# Patient Record
Sex: FEMALE | Race: ASIAN | NOT HISPANIC OR LATINO | Employment: FULL TIME | URBAN - METROPOLITAN AREA
[De-identification: names, ages, dates, MRNs, and addresses within clinical notes are randomized per-mention and may not be internally consistent; named-entity substitution may affect disease eponyms.]

---

## 2017-01-11 ENCOUNTER — GENERIC CONVERSION - ENCOUNTER (OUTPATIENT)
Dept: OTHER | Facility: OTHER | Age: 57
End: 2017-01-11

## 2017-01-11 ENCOUNTER — HOSPITAL ENCOUNTER (OUTPATIENT)
Dept: MAMMOGRAPHY | Facility: HOSPITAL | Age: 57
Discharge: HOME | End: 2017-01-11
Payer: COMMERCIAL

## 2017-01-11 DIAGNOSIS — Z12.31 ENCOUNTER FOR SCREENING MAMMOGRAM FOR MALIGNANT NEOPLASM OF BREAST: ICD-10-CM

## 2017-01-11 PROCEDURE — G0202 SCR MAMMO BI INCL CAD: HCPCS

## 2017-01-31 ENCOUNTER — GENERIC CONVERSION - ENCOUNTER (OUTPATIENT)
Dept: OTHER | Facility: OTHER | Age: 57
End: 2017-01-31

## 2017-10-06 ENCOUNTER — GENERIC CONVERSION - ENCOUNTER (OUTPATIENT)
Dept: OTHER | Facility: OTHER | Age: 57
End: 2017-10-06

## 2017-11-03 ENCOUNTER — GENERIC CONVERSION - ENCOUNTER (OUTPATIENT)
Dept: OTHER | Facility: OTHER | Age: 57
End: 2017-11-03

## 2017-12-04 ENCOUNTER — ALLSCRIPTS OFFICE VISIT (OUTPATIENT)
Dept: OTHER | Facility: OTHER | Age: 57
End: 2017-12-04

## 2017-12-04 DIAGNOSIS — F41.9 ANXIETY DISORDER: ICD-10-CM

## 2017-12-04 DIAGNOSIS — N94.9 UNSPECIFIED CONDITION ASSOCIATED WITH FEMALE GENITAL ORGANS AND MENSTRUAL CYCLE (CODE): ICD-10-CM

## 2017-12-04 DIAGNOSIS — E78.00 PURE HYPERCHOLESTEROLEMIA: ICD-10-CM

## 2018-01-10 NOTE — MISCELLANEOUS
Message   Recorded as Task   Date: 07/21/2016 06:28 PM, Created By: System   Task Name: Schedule Appointment   Assigned To: [address]   Regarding Patient: Ronan Hsieh, Status: Active   Comment:    System - 21 Jul 2016 6:28 PM        Tammy Dominguez - 21 Jul 2016 6:29 PM     TASK REASSIGNED: Previously Assigned To Namita Brink      Please call patient to schedule an appointment within 48 hours on their home phone  The best time to reach the patient is in the day  Patient prefers appointment to be  or first available  After this appointment is scheduled please reply back to [facility] team    Jalil Everett - 22 Jul 2016 8:20 AM     TASK IN PROGRESS   Jennifer Davies - 22 Jul 2016 9:58 AM     TASK EDITED  lv @ [phone]   Jennifer Davies - 26 Jul 2016 3:10 PM     TASK EDITED  sched 08/03 @ 6pm sla        Active Problems    1  Abdominal pain, RLQ (789 03) (R10 31)   2  Abnormal CT of the abdomen (793 6) (R93 5)   3  Allergic rhinitis (477 9) (J30 9)   4  Anemia (285 9) (D64 9)   5  Breast cancer screening (V76 10) (Z12 39)   6  Depression (311) (F32 9)   7  Esophageal reflux (530 81) (K21 9)   8  Hypercholesterolemia (272 0) (E78 0)   9  Post traumatic stress disorder (309 81) (F43 10)   10  History of Vasovagal syncope (780 2) (R55)    Current Meds   1  Calcium 600 + D TABS; Therapy: (Recorded:11Jun2012) to Recorded   2  Claritin 10 MG Oral Capsule; TAKE 2 CAPSULE Daily as per allergist;   Therapy: 67LXG3242 to (Evaluate:11Nov2013); Last Rx:10Nov2013 Ordered   3  Dulera 200-5 MCG/ACT Inhalation Aerosol; INHALE 2 PUFFS TWICE DAILY; Therapy: 41BVP5621 to (Last Rx:10Nov2013) Ordered   4  Escitalopram Oxalate 5 MG Oral Tablet; take 1 tablet daily in am;   Therapy: 66KHS6890 to (Evaluate:49Apn0659)  Requested for: 44XUV8710; Last   Rx:08Jan2015 Ordered   5  Montelukast Sodium 10 MG Oral Tablet; TAKE 1 TABLET EVERY MORNING;    Therapy: 85FGR9972 to (Last Rx:10Nov2013) Ordered 6  Nasonex 50 MCG/ACT Nasal Suspension (Mometasone Furoate); Therapy: (Recorded:10Nov2013) to Recorded   7  Vitamin D3 CAPS; 3000 units a day; Therapy: (Recorded:10Nov2013) to Recorded    Allergies    1  Amoxicillin CAPS   2  Codeine Derivatives   3   Contrast Media Ready-Box MISC    Signatures   Electronically signed by : Fredrick Bernal, ; Jul 26 2016  6:36PM EST                       (Author)

## 2018-01-15 NOTE — MISCELLANEOUS
Message   Recorded as Task   Date: 01/19/2017 01:19 PM, Created By: Franck Forrest   Task Name: Call Back   Assigned To: Jeff Chen   Regarding Patient: Dania Michel, Status: In Progress   Comment:    Clarita Aparicio - 19 Jan 2017 1:19 PM     TASK CREATED    PER DR CHEN - SHE MUST SEE GYN ASAP FOR SUSPICIOUS ADNEXAL MASS  NA X 2  Clarita Aparicio - 19 Jan 2017 7:00 PM     TASK EDITED  Spoke w pt and she saw Dr Tong An ob/gyn on Dec 23 and has a fu appt in March  We will call and get his notes  [phone]  Clarita Aparicio - 19 Jan 2017 7:06 PM     TASK EDITED  Mri faxed to Clarita [address] - 19 Jan 2017 7:14 PM     TASK REASSIGNED: Previously Assigned To Swathi Chakraborty - 20 Jan 2017 1:58 PM     TASK IN PROGRESS   Clarita Aparicio - 23 Jan 2017 8:04 AM     TASK EDITED  Request for records faxed to Dr Zoe Loomis  by [facility] on 1/20     Clarita Aparicio - 23 Jan 2017 6:28 PM     TASK REASSIGNED: Previously Assigned To Buddy Mccabe - 25 Jan 2017 12:07 PM     TASK EDITED  await gyn note -   make sure adnexal mass is being addressed   Jeff Chen - 31 Jan 2017 5:21 PM     TASK REPLIED TO: Previously Assigned To Jeff Chen d/[facility]-   they had seen in late Dec and did [address] and will see her again in March-   they are sending visit note/ US report        Signatures   Electronically signed by : Tamra Bullock DO; Jan 31 2017  5:21PM EST                       (Author)

## 2018-01-15 NOTE — PROGRESS NOTES
Assessment    1  Encounter for preventive health examination (V70 0) (Z00 00)    Plan  Adnexal mass    · * MRI PELVIS WO CONTRAST; Status:Need Information - Financial Authorization; Requested for:28Nov2016;   Colon cancer screening    · COLONOSCOPY; Status:Active; Requested for:28Nov2016;    · *1 - [facility] Physician Referral  Consult  Status:  Active  Requested for: 75CES1508  Care Summary provided  : Yes  Encounter for screening mammogram for breast cancer    · * MAMMO SCREENING BILATERAL W CAD; Status:Active; Requested for:28Nov2016;   PMH: Cough    · Dulera 200-5 MCG/ACT Inhalation Aerosol; INHALE 2 PUFFS TWICE DAILY    Discussion/Summary  health maintenance visit cervical cancer screening is current sees Gyn Breast cancer screening: mammogram has been ordered and gave slip for mammogram as did not do after last gyn visit  Colorectal cancer screening: the risks and benefits of colorectal cancer screening were discussed and colonoscopy has been ordered  The declines flu shot  She was advised to be evaluated by an optometrist and a dentist      Overall she has been feeling well - she does have ongoing cough/ tickle in throat at times- is switching to other Allergist as prior- Sh[facility]ain- retired- in past used allergy shots  I refilled Gerilyn Rude for her  She no longer has abdominal pain - see CT scan / US pelvis done few mo ago - has shadow left adnexa- has not yet done MRI pelvis - slip given for same  She relates she had microscopic hematuria at recent visit w Uro-Gyn and is scheduled for another CT scan - discussed as just had CT in july that showed no stones/ kidneys ok - see no need for redo CT - will forward copy CT/ US to [address] [place] [address]  She relates did blood work for [employer] and [facility]- we will get report - had lipids 1 yr ago - redo next year  She stopped Lexapro - can stay off after discussion as feels well    She does see Gyn   Slip for mammogram       Chief Complaint  physical      History of Present Illness  HPI: in for yearly PE Feels well other than cough - active  No further abd pain - see prev CT/ US Never did MRI as appears felt insurance would not cover? No pelvic bloating    Still needs colonoscopy screen sees Gyn    Also saw Uro Gyn - see note      Review of Systems    Constitutional: No fever, no chills, feels well, no tiredness, no recent weight gain or weight loss  Eyes: No complaints of eye pain, no red eyes, no eyesight problems, no discharge, no dry eyes, no itching of eyes  ENT: no complaints of earache, no loss of hearing, no nose bleeds, no nasal discharge, no sore throat, no hoarseness  Cardiovascular: No complaints of slow heart rate, no fast heart rate, no chest pain, no palpitations, no leg claudication, no lower extremity edema  Respiratory: No complaints of shortness of breath, no wheezing, no cough, no SOB on exertion, no orthopnea, no PND  Gastrointestinal: No complaints of abdominal pain, no constipation, no nausea or vomiting, no diarrhea, no bloody stools  Genitourinary: no dysuria  Musculoskeletal: No complaints of arthralgias, no myalgias, no joint swelling or stiffness, no limb pain or swelling  Integumentary: no rashes and no skin lesions  Neurological: no headache, no confusion, no dizziness, no limb weakness, no convulsions, no fainting and no difficulty walking  Psychiatric: no anxiety and no depression  Hematologic/Lymphatic: No complaints of swollen glands, no swollen glands in the neck, does not bleed easily, does not bruise easily  Active Problems    1  Allergic rhinitis (477 9) (J30 9)   2  Anemia (285 9) (D64 9)   3  Breast cancer screening (V76 10) (Z12 39)   4  Esophageal reflux (530 81) (K21 9)   5  History of depression (V11 8) (Z86 59)   6  Hypercholesterolemia (272 0) (E78 00)   7  Post traumatic stress disorder (309 81) (F43 10)   8   History of Vasovagal syncope (780 2) (R55)    Past Medical History    · History of Abdominal pain, RLQ (789 03) (R10 31)   · History of Abnormal Liver Function Test (790 6)   · History of Carpal tunnel syndrome, unspecified laterality (354 0) (G56 00)   · History of Cervical cancer screening (V76 2) (Z12 4)   · History of Cough (786 2) (R05)   · History of depression (V11 8) (Z86 59)   · History of Pain in joint of left shoulder (719 41) (M25 512)   · History of Post traumatic stress disorder (309 81) (F43 10)   · History of Vasovagal syncope (780 2) (R55)    Surgical History    · History of Cholecystectomy   · History of Complete Colonoscopy   · History of Hand Surgery    Social History    · Marital History - Currently    · Never a smoker   · Never Drank Alcohol    Current Meds   1  Calcium 600 + D TABS; Therapy: (Recorded:11Jun2012) to Recorded   2  Claritin 10 MG Oral Capsule; TAKE 2 CAPSULE Daily as per allergist;   Therapy: 10KFA9273 to (Evaluate:11Nov2013); Last Rx:10Nov2013 Ordered   3  Dulera 200-5 MCG/ACT Inhalation Aerosol; INHALE 2 PUFFS TWICE DAILY; Therapy: 18WUW3327 to (Last Rx:10Nov2013) Ordered   4  Montelukast Sodium 10 MG Oral Tablet; TAKE 1 TABLET EVERY MORNING; Therapy: 95ETG1421 to (Last Rx:10Nov2013) Ordered   5  Nasonex 50 MCG/ACT Nasal Suspension; Therapy: (Recorded:10Nov2013) to Recorded   6  Vitamin D3 CAPS; 3000 units a day; Therapy: (Recorded:10Nov2013) to Recorded    Allergies    1  Amoxicillin CAPS   2  Codeine Derivatives   3  Contrast Media Ready-Box MISC    Vitals   Recorded: 35GNL8940 83:99VD   Systolic 842   Diastolic 78   Height 5 ft 3 5 in   Weight 148 lb 6 oz   BMI Calculated 25 87   BSA Calculated 1 71     Physical Exam    Constitutional   General appearance: No acute distress, well appearing and well nourished  Pleasant healthy female seated no acute distress  Head and Face   Head and face: Normal     Palpation of the face and sinuses: No sinus tenderness      Eyes   Conjunctiva and lids: No swelling, erythema or discharge  Ears, Nose, Mouth, and Throat   Hearing: Normal     Lips, teeth, and gums: Normal, good dentition  Oropharynx: Normal with no erythema, edema, exudate or lesions  Neck   Neck: Supple, symmetric, trachea midline, no masses  Thyroid: Normal, no thyromegaly  Pulmonary   Auscultation of lungs: Clear to auscultation  Cardiovascular   Auscultation of heart: Normal rate and rhythm, normal S1 and S2, no murmurs  Carotid pulses: 2+ bilaterally  No ankle edema  Lymphatic   Palpation of lymph nodes in neck: No lymphadenopathy  Musculoskeletal   Gait and station: Normal     Neurologic   Cranial nerves: Cranial nerves II-XII intact  Psychiatric   Judgment and insight: Normal     Orientation to person, place, and time: Normal     Recent and remote memory: Intact  Mood and affect: Normal        Results/Data  PHQ-9 Adult Depression Screening 28Nov2016 12:59PM User, s     Test Name Result Flag Reference   PHQ-9 Adult Depression Score 0     Over the last two weeks, how often have you been bothered by any of the following problems? Little interest or pleasure in doing things: Not at all - 0  Feeling down, depressed, or hopeless: Not at all - 0  Trouble falling or staying asleep, or sleeping too much: Not at all - 0  Feeling tired or having little energy: Not at all - 0  Poor appetite or over eating: Not at all - 0  Feeling bad about yourself - or that you are a failure or have let yourself or your family down: Not at all - 0  Trouble concentrating on things, such as reading the newspaper or watching television: Not at all - 0  Moving or speaking so slowly that other people could have noticed   Or the opposite -  being so fidgety or restless that you have been moving around a lot more than usual: Not at all - 0  Thoughts that you would be better off dead, or of hurting yourself in some way: Not at all - 0   PHQ-9 Adult Depression Screening Negative     PHQ-9 Difficulty Level Not difficult at all     PHQ-9 Severity No Depression         Health Management  History of Cervical cancer screening   (1) THIN PREP PAP WITH IMAGING; every 5 years; Next Due: 11Aug2015;  Overdue    Future Appointments    Date/Time Provider Specialty Site   12/04/2017 03:00 PM Isi Lamb DO Family Medicine [address] [facility]     Signatures   Electronically signed by : Marlis Nissen, DO; Nov 28 2016  5:25PM EST                       (Author)

## 2018-01-16 NOTE — RESULT NOTES
Verified Results  * CT ABDOMEN PELVIS W CONTRAST 45Hqb6882 01:35PM Cristi Dunham     Test Name Result Flag Reference   CT ABDOMEN PELVIS W CONTRAST (Report)     CT ABDOMEN AND PELVIS WITH IV CONTRAST     INDICATION: Right lower quadrant pain x2 days  COMPARISON: CT abdomen 9/19/2008  TECHNIQUE: CT examination of the abdomen and pelvis was performed after the administration of intravenous contrast  This examination, like all CT scans performed in the [facility], was performed utilizing techniques to minimize    radiation dose exposure, including the use of iterative reconstruction and automated exposure control  Axial, sagittal, and coronal reformatted images were submitted for interpretation  100 cc of intravenous Omnipaque 350 was administered for this    examination  Enteric contrast was not given  The patient experienced a mild allergic reaction to the contrast dye following the injection, consisting of urticaria  She was monitored in the department to ensure resolution of the symptoms following the scan  FINDINGS:     ABDOMEN     LOWER CHEST: No significant abnormalities identified in the lower chest      LIVER/BILIARY TREE: Unremarkable  GALLBLADDER: Gallbladder is surgically absent  SPLEEN: Unremarkable  PANCREAS: Unremarkable  ADRENAL GLANDS: Unremarkable  KIDNEYS/URETERS: Unremarkable  No hydronephrosis  STOMACH AND BOWEL: There is an ovoid fatty density measuring 2 3 cm with mild surrounding fat stranding in the left inferior pelvis, seen on axial series 271 and coronal series 601 image 55, which abuts the sigmoid colon  This is suggestive of epiploic   appendagitis  Gastrointestinal tract is otherwise unremarkable  APPENDIX: A normal appendix was visualized  ABDOMINOPELVIC CAVITY: No ascites or free intraperitoneal air  No lymphadenopathy  VESSELS: Unremarkable for patient's age       PELVIS     REPRODUCTIVE ORGANS: There is a 4 2 x 2 7 x 3 9 cm complex hypodense lesion in the left adnexa, likely related to the ovary  URINARY BLADDER: Unremarkable  ABDOMINAL WALL/INGUINAL REGIONS: Unremarkable  OSSEOUS STRUCTURES: No acute fracture or destructive osseous lesion  IMPRESSION:     1  Mild inflammatory changes in the left inferior pelvis suggestive of acute epiploic appendagitis related to the sigmoid colon  This is a benign self-limited inflammatory process, usually requiring only conservative management  2  Left adnexal 4 2 cm hypodense lesion, presumably representing a complex ovarian cyst versus neoplasm  Follow-up ultrasound recommended  Findings were personally discussed via telephone with Clive Fountain at the time of dictation         Workstation performed: [id number]     Signed by:   Pita Garza MD   7/20/16   50

## 2018-01-16 NOTE — RESULT NOTES
Verified Results  * US PELVIS COMPLETE (TRANSABDOMINAL AND TRANSVAGINAL) [id number] 04:52PM Jeremias Jaimes    Order Number: [id number]    - Patient Instructions: To schedule this appointment, please contact Central Scheduling at [phone]  Test Name Result Flag Reference   US PELVIS COMPLETE (TRANSABDOMINAL AND TRANSVAGINAL) (Report)     PELVIC ULTRASOUND, COMPLETE     INDICATION: 49-year-old female for follow-up left adnexal mass  COMPARISON: CT abdomen and pelvis 7/20/2016  TECHNIQUE:  Transabdominal pelvic ultrasound was performed in sagittal and transverse planes with a curvilinear transducer  Additional transvaginal imaging was performed to better evaluate the endometrium and ovaries  Imaging included volumetric    sweeps as well as traditional still imaging technique  FINDINGS:     UTERUS:   The uterus is anteverted in position, measuring 6 2 x 2 7 x 4 6 cm  Contour and echotexture appear normal      The cervix shows no suspicious abnormality  ENDOMETRIUM:    Normal caliber of 1 2 mm  Homogenous and normal in appearance  OVARIES/ADNEXA:   Right ovary: 2 0 x 1 3 x 1 1 cm  No suspicious right ovarian abnormality  Doppler flow within normal limits  Left ovary: 4 2 x 3 0 x 3 0 cm  Shadowing arising from the prominent, heterogeneous ovary is identified which is inseparable from the normal parenchyma  A discrete mass however is difficult to discern  This likely corresponds to the CT abnormality for which MRI is recommended for    further characterization  Doppler flow within normal limits  No suspicious adnexal mass or loculated collections  There is no free fluid  IMPRESSION:      Abnormal shadowing arising from the heterogeneous left ovary corresponding to the CT finding  Further characterization with MRI recommended            Workstation performed: [id number]     Signed by:   Lauren Rae MD   8/4/16

## 2018-01-16 NOTE — PROGRESS NOTES
Assessment    1  Adnexal mass (625 8) (N94 9)   · 4cm left adnexal mass 2016    Plan  Adnexal mass    · Gynecology Follow up Evaluation and Treatment  consult re 4 cm adnexal mass seen  adjacent to left ovary on recent MRI  Status: Hold For - Scheduling  Requested for:  49Jks3775  Flu vaccine need    · Stop: Fluzone Quadrivalent Intramuscular Suspension    Discussion/Summary    *** Reacted to Gadolinium so was limited MRI *** remote itch w Contrast Dye --    We discussed recent MRI result, has 4 cm area adjacent to left ovary which needs further evaluation, probable surgery  We will set her up with her gynecologist for consultation and forward results to them  Chief Complaint  Discuss MRI results      History of Present Illness  in to discuss MRI Feels well Did react to Gadolinium     See recent PE note w me in late Nov re full exam etc      Review of Systems    Constitutional: no change in ROS otherwise  Active Problems    1  Adnexal mass (625 8) (N94 9)   2  Allergic rhinitis (477 9) (J30 9)   3  Anemia (285 9) (D64 9)   4  Breast cancer screening (V76 10) (Z12 39)   5  Colon cancer screening (V76 51) (Z12 11)   6  Encounter for screening mammogram for breast cancer (V76 12) (Z12 31)   7  Esophageal reflux (530 81) (K21 9)   8  History of depression (V11 8) (Z86 59)   9  Hypercholesterolemia (272 0) (E78 00)   10  Post traumatic stress disorder (309 81) (F43 10)   11  History of Vasovagal syncope (780 2) (R55)    Past Medical History    1  History of Abdominal pain, RLQ (789 03) (R10 31)   2  History of Abnormal Liver Function Test (790 6)   3  History of Carpal tunnel syndrome, unspecified laterality (354 0) (G56 00)   4  History of Cervical cancer screening (V76 2) (Z12 4)   5  History of Cough (786 2) (R05)   6  History of depression (V11 8) (Z86 59)   7  History of Pain in joint of left shoulder (719 41) (M25 512)   8  History of Post traumatic stress disorder (309 81) (F43 10)   9   History of Vasovagal syncope (780 2) (R55)    Surgical History    1  History of Cholecystectomy   2  History of Complete Colonoscopy   3  History of Hand Surgery    Social History    · Marital History - Currently    · Never a smoker   · Never Drank Alcohol    Current Meds   1  Calcium 600 + D TABS; Therapy: (Recorded:11Jun2012) to Recorded   2  Claritin 10 MG Oral Capsule; TAKE 2 CAPSULE Daily as per allergist;   Therapy: 47XEH7092 to (Evaluate:11Nov2013); Last Rx:10Nov2013 Ordered   3  Dulera 200-5 MCG/ACT Inhalation Aerosol; INHALE 2 PUFFS TWICE DAILY; Therapy: 11TXO0244 to (Last Rx:28Nov2016) Ordered   4  Montelukast Sodium 10 MG Oral Tablet; TAKE 1 TABLET EVERY MORNING; Therapy: 09OEB0820 to (Last Rx:10Nov2013) Ordered   5  Nasonex 50 MCG/ACT Nasal Suspension; Therapy: (Recorded:10Nov2013) to Recorded   6  Vitamin D3 CAPS; 3000 units a day; Therapy: (Recorded:10Nov2013) to Recorded    Allergies    1  Gadolinium Derivatives   2  Amoxicillin CAPS   3  Codeine Derivatives   4  Contrast Media Ready-Box MISC    Vitals  Vital Signs    Recorded: 74Lku3844 10:29AM   Heart Rate 64   Systolic 744   Diastolic 70   Height 5 ft 3 5 in   Weight 149 lb 2 08 oz   BMI Calculated 26   BSA Calculated 1 71     Health Management  History of Cervical cancer screening   (1) THIN PREP PAP WITH IMAGING; every 5 years; Next Due: 11Aug2015;  Overdue    Future Appointments    Date/Time Provider Specialty Site   12/04/2017 03:00 PM Kenia Perla DO Family Medicine [address] [facility] MED[facility]     Signatures   Electronically signed by : Stefano Elam DO; Dec 21 2016 11:04AM EST                       (Author)

## 2018-01-17 NOTE — RESULT NOTES
Verified Results  * MAMMO SCREENING BILATERAL W CAD [id number] 02:37PM Cathlean Schlatter Order Number: [id number]    - Patient Instructions: To schedule this appointment, please contact Central Scheduling at [phone]  Do not wear any perfume, powder, lotion or deodorant on breast or underarm area  Please bring your doctors order, referral (if needed) and insurance information with you on the day of the test  Failure to bring this information may result in this test being rescheduled  Arrive 15 minutes prior to your appointment time to register  On the day of your test, please bring any prior mammogram or breast studies with you that were not performed at a [facility]  Failure to bring prior exams may result in your test needing to be rescheduled  Test Name Result Flag Reference   MAMMO SCREENING BILATERAL W CAD (Report)     Patient History:   Patient is postmenopausal    No known family history of cancer  Patient has never smoked  Patient's BMI is 26 2  Reason for exam: screening (asymptomatic)  Mammo Screening Bilateral W CAD: January 11, 2017 - Check In #:    [de-identified]   Bilateral MLO, CC, and XCCL view(s) were taken  Technologist: Janki Quinones R T (R)(M)   Prior study comparison: November 24, 2014, bilateral digital    screening mammogram, performed at [facility] 112  February 22, 2007, bilateral digital screening    mammo w/CAD, performed at [facility] 89  There are scattered fibroglandular densities  The parenchymal pattern appears stable  No dominant soft tissue    mass or suspicious calcifications are noted  The skin and nipple   contours are within normal limits  No mammographic evidence of malignancy  No    significant changes when compared with prior studies  ASSESSMENT: BiRad:1 - Negative     Recommendation:   Routine screening mammogram in 1 year   A reminder letter will be   scheduled  Analyzed by CAD     8-10% of cancers will be missed on mammography  Management of a    palpable abnormality must be based on clinical grounds  Patients   will be notified of their results via letter from our facility  Accredited by [employer] of [employer] and FDA       Transcription Location: JODIE Hsieh 98: [id number]     Risk Value(s):   Tyrer-Cuzick 10 Year: 3 232%, Tyrer-Cuzick Lifetime: 8 827%,    Myriad Table: 1 5%, ELIZABETH 5 Year: 1 4%, NCI Lifetime: 7 8%   Signed by:   Victor Hugo Scruggs MD   1/11/17

## 2018-01-23 VITALS
DIASTOLIC BLOOD PRESSURE: 70 MMHG | BODY MASS INDEX: 25.03 KG/M2 | SYSTOLIC BLOOD PRESSURE: 128 MMHG | HEART RATE: 72 BPM | HEIGHT: 63 IN | WEIGHT: 141.25 LBS

## 2018-01-23 NOTE — PROGRESS NOTES
Assessment    1  Encounter for preventive health examination (V70 0) (Z00 00)    Plan  Adnexal mass    · (1) COMPREHENSIVE METABOLIC PANEL; Status:Active; Requested for:10Atu9268; Anxiety    · Sertraline HCl - 50 MG Oral Tablet; take 1/2 pill in AM x 1 week then 1 QAM   · (1) TSH WITH FT4 REFLEX; Status:Active; Requested for:64Gzx6592;   Hypercholesterolemia    · (1) LIPID PANEL, FASTING; Status:Active; Requested for:70Kff1094;   PMH: Vasovagal syncope    · COLONOSCOPY; Status:Active; Requested for:45Pbb9890;     Discussion/Summary  health maintenance visit Currently, she eats an adequate diet and walks for exercise  cervical cancer screening is current Breast cancer screening: mammogram is current and 1/17  Colorectal cancer screening: the risks and benefits of colorectal cancer screening were discussed and she will set up redo w [facility]/ Dr Austin Bazzi  She is doing well here today, she does have past history anxiety, previously felt nauseated with citalopram, I would like her to try sertraline once daily in the morning after using 1/2 pill for the 1st week, we did discuss this will take 4 to 6 weeks to fully take effect, she can call us with an update then, I would like to see her every 6 months  We did review her visit in early October to emergency room with [place], hemoglobin 12 7, CMP okay other than glucose 116, chest x-ray and EKG okay  Longstanding history orthostatics symptoms/ vasovagal syncope, call us if this worsens, make sure to keep hydrated  She will be seeing her gynecologist later this week regarding left adnexal mass seen on prior MRI, she relates she has been following up for this with them  Also had seen urogynecologist in the past      She has not seen her allergist, is off inhalers / Singulair, call if increased cough or wheezing      She did see orthopedist regarding right shoulder,    She will do fasting blood work including lipids, continue to watch healthy diet, appears her weight loss is related to healthy diet  6  Chief Complaint  she is in for a regular Physical      History of Present Illness  HPI: in for reg check - she has been feeling well - other than occ lightheadedness- longstanding issue w vasovagal symptoms- was at ER [facility] 10/17 - BW/ CXR/ EKG were fine -   Relates lost weight as has been watching healthy diet  Not seeing Allergist - hers retired - she stopped meds for that and denies inc cough or wheeze    Sees Gyn- had eval w uro-gyn- never did biopsy/ has adnexal mass - relates will be seeing Gyn again this week to re-eval w [address] re right shoulder     Does have past history anxiety with posttraumatic stress related to trauma when younger in [facility], - previously use citalopram but stopped as felt cause some nausea although it did help, now requests new medication, note some mood swings with irritability along with anxiety  Review of Systems    Constitutional: as noted in HPI, no fever, not feeling poorly, no chills and not feeling tired  Eyes: no eyesight problems  ENT: no earache and no sore throat  Cardiovascular: No complaints of slow heart rate, no fast heart rate, no chest pain, no palpitations, no leg claudication, no lower extremity edema  Respiratory: No complaints of shortness of breath, no wheezing, no cough, no SOB on exertion, no orthopnea, no PND  Gastrointestinal: no gerd, but No complaints of abdominal pain, no constipation, no nausea or vomiting, no diarrhea, no bloody stools  Genitourinary: occ pelvic pressure, but no dysuria  Musculoskeletal: as noted in HPI  Integumentary: no skin lesions and no skin wound  Neurological: as noted in HPI, no headache, no confusion, no limb weakness and no convulsions  Psychiatric: some anxiety felt  Hematologic/Lymphatic: No complaints of swollen glands, no swollen glands in the neck, does not bleed easily, does not bruise easily  Active Problems    1   Adnexal mass (625  8) (N94 9)   2  Allergic rhinitis (477 9) (J30 9)   3  Anemia (285 9) (D64 9)   4  Contrast media adverse reaction (977 8) (T50 8X5A)   5  Esophageal reflux (530 81) (K21 9)   6  History of depression (V11 8) (Z86 59)   7  Hypercholesterolemia (272 0) (E78 00)   8  Post traumatic stress disorder (309 81) (F43 10)   9  History of Vasovagal syncope (780 2) (R55)    Past Medical History    · History of Abdominal pain, RLQ (789 03) (R10 31)   · History of Abnormal Liver Function Test (790 6)   · History of Carpal tunnel syndrome, unspecified laterality (354 0) (G56 00)   · History of Cervical cancer screening (V76 2) (Z12 4)   · History of Cough (786 2) (R05)   · History of depression (V11 8) (Z86 59)   · History of Pain in joint of left shoulder (719 41) (M25 512)   · History of Post traumatic stress disorder (309 81) (F43 10)   · History of Vasovagal syncope (780 2) (R55)    Surgical History    · History of Cholecystectomy   · History of Complete Colonoscopy   · History of Hand Surgery    Social History    · Marital History - Currently    · Never a smoker   · Never Drank Alcohol    Current Meds   1  Calcium 600 + D TABS; Therapy: (Recorded:11Jun2012) to Recorded   2  Claritin 10 MG Oral Capsule; TAKE 2 CAPSULE Daily as per allergist;   Therapy: 50TZV9520 to (Evaluate:11Nov2013); Last Rx:10Nov2013 Ordered   3  Vitamin D3 CAPS; 3000 units a day; Therapy: (Recorded:10Nov2013) to Recorded    Allergies    1  Gadolinium Derivatives   2  Amoxicillin CAPS   3  Codeine Derivatives   4  Contrast Media Ready-Box MISC    Vitals   Recorded: 99XGK8862 02:52PM   Heart Rate 72   Systolic 013   Diastolic 70   Height 5 ft 3 in   Weight 141 lb 4 oz   BMI Calculated 25 02   BSA Calculated 1 67     Physical Exam    Constitutional   General appearance: No acute distress, well appearing and well nourished  healthy female     Head and Face   Head and face: Normal     Eyes   Conjunctiva and lids: No swelling, erythema or discharge  Ears, Nose, Mouth, and Throat   Otoscopic examination: Tympanic membranes translucent with normal light reflex  Canals patent without erythema  Hearing: Normal     Lips, teeth, and gums: Normal, good dentition  Oropharynx: Normal with no erythema, edema, exudate or lesions  Neck   Neck: Supple, symmetric, trachea midline, no masses  Thyroid: Normal, no thyromegaly  Pulmonary   Auscultation of lungs: Clear to auscultation  Cardiovascular   Auscultation of heart: Normal rate and rhythm, normal S1 and S2, no murmurs  Carotid pulses: 2+ bilaterally  Examination of extremities for edema and/or varicosities: Normal     Abdomen   Abdomen: Non-tender, no masses  Lymphatic   Palpation of lymph nodes in neck: No lymphadenopathy  Musculoskeletal   Gait and station: Normal     Neurologic   Cortical function: Normal mental status  Coordination: Normal finger to nose and heel to shin  Psychiatric   Judgment and insight: Normal     Orientation to person, place, and time: Normal     Recent and remote memory: Intact  Mood and affect: Normal        Health Management  History of Cervical cancer screening   (1) THIN PREP PAP WITH IMAGING; every 5 years; Next Due: 07Yit9939;  Overdue    Signatures   Electronically signed by : Analilia Figueroa DO; Dec  4 2017  3:56PM EST                       (Author)

## 2018-06-18 RX ORDER — IBUPROFEN 200 MG
200 TABLET ORAL EVERY 6 HOURS
COMMUNITY
End: 2018-12-03 | Stop reason: ALTCHOICE

## 2018-06-18 RX ORDER — MOMETASONE FUROATE 50 UG/1
SPRAY, METERED NASAL
COMMUNITY
End: 2018-06-19 | Stop reason: ALTCHOICE

## 2018-06-18 RX ORDER — NABUMETONE 500 MG/1
500 TABLET, FILM COATED ORAL 2 TIMES DAILY
COMMUNITY
Start: 2017-10-05 | End: 2018-06-19 | Stop reason: ALTCHOICE

## 2018-06-18 RX ORDER — MONTELUKAST SODIUM 10 MG/1
1 TABLET ORAL DAILY
COMMUNITY
Start: 2013-11-10 | End: 2018-06-19 | Stop reason: ALTCHOICE

## 2018-06-19 ENCOUNTER — OFFICE VISIT (OUTPATIENT)
Dept: FAMILY MEDICINE CLINIC | Facility: CLINIC | Age: 58
End: 2018-06-19
Payer: COMMERCIAL

## 2018-06-19 VITALS
SYSTOLIC BLOOD PRESSURE: 136 MMHG | HEIGHT: 63 IN | HEART RATE: 72 BPM | DIASTOLIC BLOOD PRESSURE: 80 MMHG | BODY MASS INDEX: 25.59 KG/M2 | WEIGHT: 144.4 LBS | OXYGEN SATURATION: 96 %

## 2018-06-19 DIAGNOSIS — F41.9 ANXIETY: Primary | ICD-10-CM

## 2018-06-19 DIAGNOSIS — Z12.31 SCREENING MAMMOGRAM, ENCOUNTER FOR: ICD-10-CM

## 2018-06-19 PROBLEM — N32.9 LESION OF BLADDER: Status: ACTIVE | Noted: 2017-01-19

## 2018-06-19 PROBLEM — R31.29 MICROSCOPIC HEMATURIA: Status: ACTIVE | Noted: 2017-01-19

## 2018-06-19 PROCEDURE — 1036F TOBACCO NON-USER: CPT | Performed by: FAMILY MEDICINE

## 2018-06-19 PROCEDURE — 3008F BODY MASS INDEX DOCD: CPT | Performed by: FAMILY MEDICINE

## 2018-06-19 PROCEDURE — 99213 OFFICE O/P EST LOW 20 MIN: CPT | Performed by: FAMILY MEDICINE

## 2018-06-19 NOTE — PATIENT INSTRUCTIONS
She is doing very well with sertraline 50 mg daily, she will stay on this as is  She will continue with routine exercise /walking  We will see her again in 6 months with a yearly physical, we will plan to redo CMP /lipids/ CBC ( remote anemia) at that time  In December her TSH, CMP were fine  Cholesterol at that time was 2:06 a m / HDL 63,  which had been improved versus prior  She has been seeing gynecology, left adnexal mass /possible dermoid on ultrasound in January, we will touch base with them regarding when they want to see her for follow-up as she has no appointment scheduled  She also has seen Urology regarding bladder       she will be seeing Gastroenterology soon for redo colonoscopy

## 2018-06-19 NOTE — PROGRESS NOTES
FAMILY PRACTICE OFFICE VISIT  [address]  [address]   [address]  [place], [place], [address]      NAME: Trae Bush  AGE: 59 y o  SEX: female  : 1960   MRN: [id number]    DATE: 2018  TIME: 8:19 PM    Assessment and Plan     Problem List Items Addressed This Visit        Other    Anxiety - Primary    Relevant Medications    sertraline (ZOLOFT) 50 mg tablet      Other Visit Diagnoses     Screening mammogram, encounter for        Relevant Orders    Mammo diagnostic bilateral w cad          Patient Instructions   She is doing very well with sertraline 50 mg daily, she will stay on this as is  She will continue with routine exercise /walking  We will see her again in 6 months with a yearly physical, we will plan to redo CMP /lipids/ CBC ( remote anemia) at that time  In December her TSH, CMP were fine  Cholesterol at that time was 2:06 a m / HDL 63,  which had been improved versus prior  She has been seeing gynecology, left adnexal mass /possible dermoid on ultrasound in January, we will touch base with them regarding when they want to see her for follow-up as she has no appointment scheduled  She also has seen Urology regarding bladder  she will be seeing Gastroenterology soon for redo colonoscopy      Chief Complaint     Chief Complaint   Patient presents with    Follow-up     6 month Anxiety       History of Present Illness   Dipika Bush is a 59y o -year-old female who is in today for his 6 month check, I had seen her in December for a full physical   She is feeling very well, sertraline is helping, she has no complaints here today      Review of Systems   Review of Systems   Constitutional: Negative for appetite change, fatigue, fever and unexpected weight change  HENT: Negative for sore throat and trouble swallowing  Respiratory: Negative for cough, chest tightness and shortness of breath      Cardiovascular: Negative for chest pain, palpitations and leg swelling  Gastrointestinal: Negative for abdominal pain and blood in stool  Genitourinary: Negative for dysuria and hematuria  Neurological: Negative for dizziness, light-headedness and headaches  Active Problem List     Patient Active Problem List   Diagnosis    Adnexal mass    Allergic rhinitis    Anxiety    Contrast media adverse reaction    Esophageal reflux    Hypercholesterolemia    Lesion of bladder    Post traumatic stress disorder    Microscopic hematuria    ILANA (stress urinary incontinence, female)       Past Medical History:  Past Medical History:   Diagnosis Date    Abnormal LFTs     resolved    Carpal tunnel syndrome on right     Depression     last assessed 11/11/13    PTSD (post-traumatic stress disorder)     Vasovagal syncope     last assessed 06/23/14       Past Surgical History:  Past Surgical History:   Procedure Laterality Date    CHOLECYSTECTOMY      COLONOSCOPY      TRIGGER FINGER RELEASE Right        Family History:  No family history on file  Social History:  Social History     Social History    Marital status: Single     Spouse name: N/A    Number of children: N/A    Years of education: N/A     Occupational History    Not on file  Social History Main Topics    Smoking status: Never Smoker    Smokeless tobacco: Never Used    Alcohol use No    Drug use: No    Sexual activity: Not on file     Other Topics Concern    Not on file     Social History Narrative           Objective     Vitals:    06/19/18 1448   BP: 136/80   Pulse: 72   SpO2: 96%   Weight: 65 5 kg (144 lb 6 4 oz)   Height: 5' 3" (1 6 m)     Body mass index is 25 58 kg/m²      BP Readings from Last 3 Encounters:   06/19/18 136/80   12/04/17 128/70   12/21/16 144/70       Wt Readings from Last 3 Encounters:   06/19/18 65 5 kg (144 lb 6 4 oz)   12/04/17 64 1 kg (141 lb 4 oz)   12/21/16 67 6 kg (149 lb 2 1 oz)       Physical Exam Constitutional: She is oriented to person, place, and time  She appears well-developed and well-nourished  No distress  HENT:   TM clear   Eyes: Conjunctivae are normal  No scleral icterus  Cardiovascular: Normal rate, regular rhythm and normal heart sounds  No murmur heard  No carotid bruit   Pulmonary/Chest: Effort normal and breath sounds normal  No respiratory distress  Musculoskeletal: She exhibits no edema  Lymphadenopathy:     She has no cervical adenopathy  Neurological: She is alert and oriented to person, place, and time  Psychiatric: She has a normal mood and affect  Her behavior is normal        ALLERGIES:  Allergies   Allergen Reactions    Gadolinium Anaphylaxis    Codeine Syncope    Amoxicillin Rash    Iodinated Diagnostic Agents Hives and Itching     [facility] [id number]: throat itching    Metrizamide Hives and Itching       Current Medications     Current Outpatient Prescriptions   Medication Sig Dispense Refill    Calcium Carb-Cholecalciferol (CALCIUM 600 + D PO) Take 1 tablet by mouth daily      Cholecalciferol (VITAMIN D3 PO) Take 3,000 Units by mouth daily      fexofenadine (ALLEGRA) 180 MG tablet Take 180 mg by mouth daily      ibuprofen (MOTRIN) 200 mg tablet Take 200 mg by mouth every 6 (six) hours      Multiple Vitamins-Minerals (ONE-A-DAY 50 PLUS PO) Take 1 tablet by mouth daily      sertraline (ZOLOFT) 50 mg tablet Take 1 tablet (50 mg total) by mouth daily 90 tablet 3     No current facility-administered medications for this visit                Most recent labs available from [address] [place] [address]   ( others may be available in [facility] / Media sections)  Lab Results   Component Value Date    CREATININE 0 69 12/07/2016    BUN 14 12/07/2016     No results found for: LDLCALC  No results found for: TSH      Orders Placed This Encounter   Procedures    Mammo diagnostic bilateral w cad         Alisa Garrido DO

## 2018-07-02 ENCOUNTER — TELEPHONE (OUTPATIENT)
Dept: FAMILY MEDICINE CLINIC | Facility: CLINIC | Age: 58
End: 2018-07-02

## 2018-07-02 DIAGNOSIS — Z12.31 VISIT FOR SCREENING MAMMOGRAM: Primary | ICD-10-CM

## 2018-07-02 NOTE — TELEPHONE ENCOUNTER
They have order for diag mammo w screening code  Need clarification  Per chart , screening mammo is recommended  New order faxed to [phone]

## 2018-07-10 ENCOUNTER — HOSPITAL ENCOUNTER (OUTPATIENT)
Dept: MAMMOGRAPHY | Facility: CLINIC | Age: 58
Discharge: HOME | End: 2018-07-10
Payer: COMMERCIAL

## 2018-07-10 DIAGNOSIS — Z12.31 VISIT FOR SCREENING MAMMOGRAM: ICD-10-CM

## 2018-07-10 PROCEDURE — 77067 SCR MAMMO BI INCL CAD: CPT

## 2018-12-03 ENCOUNTER — OFFICE VISIT (OUTPATIENT)
Dept: FAMILY MEDICINE CLINIC | Facility: CLINIC | Age: 58
End: 2018-12-03
Payer: COMMERCIAL

## 2018-12-03 VITALS
WEIGHT: 148 LBS | DIASTOLIC BLOOD PRESSURE: 84 MMHG | SYSTOLIC BLOOD PRESSURE: 136 MMHG | OXYGEN SATURATION: 99 % | BODY MASS INDEX: 26.22 KG/M2 | HEIGHT: 63 IN | HEART RATE: 68 BPM

## 2018-12-03 DIAGNOSIS — Z23 NEED FOR INFLUENZA VACCINATION: ICD-10-CM

## 2018-12-03 DIAGNOSIS — Z11.59 ENCOUNTER FOR HEPATITIS C SCREENING TEST FOR LOW RISK PATIENT: ICD-10-CM

## 2018-12-03 DIAGNOSIS — F41.9 ANXIETY: ICD-10-CM

## 2018-12-03 DIAGNOSIS — Z00.00 WELL ADULT HEALTH CHECK: Primary | ICD-10-CM

## 2018-12-03 DIAGNOSIS — R31.29 MICROSCOPIC HEMATURIA: ICD-10-CM

## 2018-12-03 DIAGNOSIS — E78.00 HYPERCHOLESTEROLEMIA: ICD-10-CM

## 2018-12-03 PROCEDURE — 90471 IMMUNIZATION ADMIN: CPT

## 2018-12-03 PROCEDURE — 90682 RIV4 VACC RECOMBINANT DNA IM: CPT

## 2018-12-03 PROCEDURE — 99396 PREV VISIT EST AGE 40-64: CPT | Performed by: FAMILY MEDICINE

## 2018-12-03 NOTE — PATIENT INSTRUCTIONS
She is in today for a routine physical/ check up    She is doing very well, she will continue on Sertraline 50 mg daily as is  Immunization History   Administered Date(s) Administered    Influenza 09/11/2013    Influenza Quadrivalent, 6-35 Months IM 10/24/2015    Influenza TIV (IM) 09/17/2012, 10/01/2013, 10/20/2014, 10/20/2015    Td (adult), adsorbed 09/17/2012    Tdap 11/11/2014    Tuberculin Skin Test-PPD Intradermal 06/18/2012, 06/20/2012     She does do yearly Flu shot  Today  Tdap/tetanus shot is up to date  (done every 10 yrs for superficial cuts, every 5 yrs for deep wounds)   Can also look into coverage for new shingles shot, Shingrix (indicated if over 48years of age ) Can do that at pharmacy  Was never a smoker     Regarding Colon Cancer screening, we discussed Colonoscopy vs ColoGuard is indicated starting at age 48  Screening is up to date,   Negative scope in Sept     She does see her Gynecologist routinely  she will be seeing soon   1 year ago ultrasound showed left adnexal ovarian dermoid, she will discuss re-doing ultrasound with gynecology at upcoming visit  She has discussed this with them in the past   Discussed screening Mammogram, this is up-to-date  /   was negative back in July  Hepatis C Screening indicated for 'baby boomers' -  after discussion she will do Hepatitis C screen  low risk    We did review previous blood work, December 2017 TSH was fine  She is not up to date with Lipid screening  Ordered   She is up to date with Diabetes screening  Continue w walking/ healthy diet      We will see her again in 12 months, sooner as needed

## 2018-12-03 NOTE — PROGRESS NOTES
FAMILY PRACTICE OFFICE VISIT  Leny TOMAS  [address]  [address]   [address]  [place], [place], [address]      NAME: Trae Bush  AGE: 59 y o  SEX: female  : 1960   MRN: [id number]    DATE: 12/3/2018  TIME: 11:52 AM    Assessment and Plan     Problem List Items Addressed This Visit        Unprioritized    Anxiety    Hypercholesterolemia    Relevant Orders    Comprehensive metabolic panel    Lipid panel    Microscopic hematuria    Relevant Orders    Comprehensive metabolic panel    CBC    Urinalysis with microscopic      Other Visit Diagnoses     Well adult health check    -  Primary    Need for influenza vaccination        Relevant Orders    PREFERRED: influenza vaccine, [id number], quadrivalent, recombinant, PF, 0 5 mL, for patients 18 yr+ (FLUBLOK) (Completed)    Encounter for hepatitis C screening test for low risk patient        Relevant Orders    Hepatitis C antibody          Patient Instructions     She is in today for a routine physical/ check up    She is doing very well, she will continue on Sertraline 50 mg daily as is  Immunization History   Administered Date(s) Administered    Influenza 2013    Influenza Quadrivalent, 6-35 Months IM 10/24/2015    Influenza TIV (IM) 2012, 10/01/2013, 10/20/2014, 10/20/2015    Td (adult), adsorbed 2012    Tdap 2014    Tuberculin Skin Test-PPD Intradermal 2012, 2012     She does do yearly Flu shot  Today  Tdap/tetanus shot is up to date  (done every 10 yrs for superficial cuts, every 5 yrs for deep wounds)   Can also look into coverage for new shingles shot, Shingrix (indicated if over 48years of age ) Can do that at pharmacy  Was never a smoker     Regarding Colon Cancer screening, we discussed Colonoscopy vs ColoGuard is indicated starting at age 48  Screening is up to date,   Negative scope in Sept     She does see her Gynecologist routinely      she will be seeing soon   1 year ago ultrasound showed left adnexal ovarian dermoid, she will discuss re-doing ultrasound with gynecology at upcoming visit  She has discussed this with them in the past   Discussed screening Mammogram, this is up-to-date  /   was negative back in July  Hepatis C Screening indicated for 'baby boomers' -  after discussion she will do Hepatitis C screen  low risk    We did review previous blood work, December 2017 TSH was fine  She is not up to date with Lipid screening  Ordered   She is up to date with Diabetes screening  Continue w walking/ healthy diet  We will see her again in 12 months, sooner as needed                 Chief Complaint     Chief Complaint   Patient presents with    Well Check       History of Present Illness   Daryle Roe Nou is a 59y o -year-old female who is in today for a routine physical, she has been feeling very well  Review of Systems   Review of Systems   Constitutional: Negative for activity change, appetite change, chills, fatigue, fever and unexpected weight change  HENT: Negative for congestion, mouth sores, nosebleeds, sinus pressure, sore throat and trouble swallowing  Eyes: Negative for visual disturbance  Respiratory: Negative for cough, choking, chest tightness and shortness of breath  Cardiovascular: Negative for chest pain, palpitations and leg swelling  Gastrointestinal: Negative for abdominal distention, abdominal pain, blood in stool, constipation, diarrhea, nausea and vomiting  No acid reflux     No change in bowel  Had negative colonoscopy   Genitourinary: Negative for dysuria and hematuria  She will be making follow-up appointment with Gynecology, she relates they are aware left adnexal growth, ovarian dermoid, she relates they plan to redo ultrasound  Neurological: Negative for dizziness, syncope, speech difficulty, weakness, light-headedness and headaches     Hematological: Does not bruise/bleed easily  Psychiatric/Behavioral: Negative for behavioral problems, confusion and sleep disturbance  Doing well with sertraline       Active Problem List     Patient Active Problem List   Diagnosis    Adnexal mass    Allergic rhinitis    Anxiety    Contrast media adverse reaction    Hypercholesterolemia    Lesion of bladder    Post traumatic stress disorder    Microscopic hematuria    ILANA (stress urinary incontinence, female)       Past Medical History:  Past Medical History:   Diagnosis Date    Abnormal LFTs     resolved    Carpal tunnel syndrome on right     Depression     last assessed 11/11/13    PTSD (post-traumatic stress disorder)     Vasovagal syncope     last assessed 06/23/14       Past Surgical History:  Past Surgical History:   Procedure Laterality Date    CHOLECYSTECTOMY      COLONOSCOPY      TRIGGER FINGER RELEASE Right        Family History:  No family history on file  Social History:  Social History     Social History    Marital status: Single     Spouse name: N/A    Number of children: N/A    Years of education: N/A     Occupational History    Not on file  Social History Main Topics    Smoking status: Never Smoker    Smokeless tobacco: Never Used    Alcohol use No    Drug use: No    Sexual activity: Not on file     Other Topics Concern    Not on file     Social History Narrative           Objective     Vitals:    12/03/18 0844   BP: 136/84   BP Location: Left arm   Patient Position: Sitting   Cuff Size: Large   Pulse: 68   SpO2: 99%   Weight: 67 1 kg (148 lb)   Height: 5' 3" (1 6 m)     Body mass index is 26 22 kg/m²  BP Readings from Last 3 Encounters:   12/03/18 136/84   06/19/18 136/80   12/04/17 128/70       Wt Readings from Last 3 Encounters:   12/03/18 67 1 kg (148 lb)   06/19/18 65 5 kg (144 lb 6 4 oz)   12/04/17 64 1 kg (141 lb 4 oz)       Physical Exam   Constitutional: She is oriented to person, place, and time   She appears well-developed and well-nourished  No distress  HENT:   Mouth/Throat: Oropharynx is clear and moist    TM clear b/l   Eyes: Conjunctivae are normal  Right eye exhibits no discharge  Left eye exhibits no discharge  No scleral icterus  Neck: Neck supple  Carotid bruit is not present  No thyromegaly present  Cardiovascular: Normal rate, regular rhythm and normal heart sounds  No murmur heard  No carotid bruit   Pulmonary/Chest: Effort normal and breath sounds normal  No respiratory distress  She has no wheezes  She has no rales  Abdominal: Soft  There is no tenderness  Musculoskeletal: She exhibits no edema  Lymphadenopathy:     She has no cervical adenopathy  Neurological: She is alert and oriented to person, place, and time  No cranial nerve deficit  Coordination normal    Skin: She is not diaphoretic  Psychiatric: She has a normal mood and affect  Her behavior is normal    Nursing note and vitals reviewed  ALLERGIES:  Allergies   Allergen Reactions    Gadolinium Anaphylaxis    Codeine Syncope    Amoxicillin Rash    Iodinated Diagnostic Agents Hives and Itching     [facility] - [id number]: throat itching    Metrizamide Hives and Itching       Current Medications     Current Outpatient Prescriptions   Medication Sig Dispense Refill    Calcium Carb-Cholecalciferol (CALCIUM 600 + D PO) Take 1 tablet by mouth daily      Cholecalciferol (VITAMIN D3 PO) Take 3,000 Units by mouth daily      fexofenadine (ALLEGRA) 180 MG tablet Take 180 mg by mouth daily      Multiple Vitamins-Minerals (ONE-A-DAY 50 PLUS PO) Take 1 tablet by mouth daily      Omega-3 Fatty Acids (OMEGA 3 PO) Take 500 mg by mouth daily Once daily      sertraline (ZOLOFT) 50 mg tablet 1 tablet every morning  0     No current facility-administered medications for this visit                Most recent labs available from [address] [place] [address]   ( others may be available in [facility] / Media sections)  Lab Results   Component Value Date CREATININE 0 69 12/07/2016    BUN 14 12/07/2016     No results found for: LDLCALC  No results found for: [id number], TSH      Orders Placed This Encounter   Procedures    PREFERRED: influenza vaccine, [id number], quadrivalent, recombinant, PF, 0 5 mL, for patients 18 yr+ (FLUBLOK)    Comprehensive metabolic panel    CBC    Urinalysis with microscopic    Lipid panel    Hepatitis C antibody         Tiffany Linder,

## 2018-12-08 LAB — HEPATITIS C ANTIBODY (HISTORICAL): NEGATIVE

## 2020-01-01 DIAGNOSIS — F41.9 ANXIETY: ICD-10-CM

## 2020-01-01 DIAGNOSIS — F43.10 POST TRAUMATIC STRESS DISORDER: Primary | ICD-10-CM

## 2020-01-13 ENCOUNTER — OFFICE VISIT (OUTPATIENT)
Dept: FAMILY MEDICINE CLINIC | Facility: CLINIC | Age: 60
End: 2020-01-13
Payer: COMMERCIAL

## 2020-01-13 VITALS
DIASTOLIC BLOOD PRESSURE: 70 MMHG | HEIGHT: 63 IN | WEIGHT: 154 LBS | SYSTOLIC BLOOD PRESSURE: 130 MMHG | BODY MASS INDEX: 27.29 KG/M2 | OXYGEN SATURATION: 98 % | HEART RATE: 56 BPM

## 2020-01-13 DIAGNOSIS — F41.9 ANXIETY: ICD-10-CM

## 2020-01-13 DIAGNOSIS — E66.3 OVERWEIGHT WITH BODY MASS INDEX (BMI) 25.0-29.9: ICD-10-CM

## 2020-01-13 DIAGNOSIS — E78.00 HYPERCHOLESTEROLEMIA: ICD-10-CM

## 2020-01-13 DIAGNOSIS — R05.9 COUGH: ICD-10-CM

## 2020-01-13 DIAGNOSIS — Z00.00 WELL ADULT HEALTH CHECK: Primary | ICD-10-CM

## 2020-01-13 DIAGNOSIS — Z12.31 ENCOUNTER FOR SCREENING MAMMOGRAM FOR BREAST CANCER: ICD-10-CM

## 2020-01-13 DIAGNOSIS — R31.29 MICROSCOPIC HEMATURIA: ICD-10-CM

## 2020-01-13 PROBLEM — N32.9 LESION OF BLADDER: Status: RESOLVED | Noted: 2017-01-19 | Resolved: 2020-01-13

## 2020-01-13 PROCEDURE — 99396 PREV VISIT EST AGE 40-64: CPT | Performed by: FAMILY MEDICINE

## 2020-01-13 PROCEDURE — 3008F BODY MASS INDEX DOCD: CPT | Performed by: FAMILY MEDICINE

## 2020-01-13 RX ORDER — ACETAMINOPHEN 160 MG
2000 TABLET,DISINTEGRATING ORAL DAILY
Start: 2020-01-13

## 2020-01-13 NOTE — PATIENT INSTRUCTIONS
Reviewed health history along with medication, she is doing well with sertraline 50 mg daily, she will continue with this as is  We did review previous blood work,   She is up to date with Lipid screening  Her cholesterol 1 year ago had risen significantly to 272 with HDL 64,   Slip given to redo fasting blood work, if LDL greater than 190 we will start statin  She will continue to walk for exercise, watch healthy diet  She will try to lose 5-10 lb  Without factoring in LDL her ASCVD 10 year risk calculator is 4 3%   She is up to date with Diabetes screening  Immunization History   Administered Date(s) Administered    INFLUENZA 09/11/2013    Influenza Quadrivalent, 6-35 Months IM 10/24/2015    Influenza TIV (IM) 09/17/2012, 10/01/2013, 10/20/2014, 10/20/2015    Influenza, recombinant, quadrivalent,injectable, preservative free 12/03/2018    Td (adult), adsorbed 09/17/2012    Tdap 11/11/2014    Tuberculin Skin Test-PPD Intradermal 06/18/2012, 06/20/2012     She did no do Flu shot this season -  Should do yearly in the Fall  Tdap/tetanus shot is up to date  (done every 10 yrs for superficial cuts, every 5 yrs for deep wounds)   Can also look into coverage for new shingles shot, Shingrix  Can do that at pharmacy  Was never a smoker -   She does complain of few months tickle/irritation in throat, coughs with eating  GERD symptoms very rarely  She will do chest x-ray  If symptoms are continuing she can see ENT  Regarding Colon Cancer screening,    Screening is up to date  (   Had colonoscopy September 2018  )    She does see her Gynecologist routinely  Had Pap test in January 2019, also had seen them in November 2019  Savage Mcguire Discussed screening Mammogram, this is  ordered  Last was in July 2018  Hepatitis C Screening indicated for 'baby boomers' -     previously perfomed and was negative  We will see her again in 12 months, sooner as needed

## 2020-01-13 NOTE — PROGRESS NOTES
BMI Counseling: Body mass index is 27 28 kg/m²  The BMI is above normal  Nutrition recommendations include decreasing portion sizes, encouraging healthy choices of fruits and vegetables and moderation in carbohydrate intake  Exercise recommendations include exercising 3-5 times per week  Depression Screening and Follow-up Plan: Patient's depression screening was positive with a PHQ-2 score of 0  Clincally patient does not have depression  No treatment is required  Patient advised to follow-up with PCP for further management  FAMILY PRACTICE OFFICE VISIT  [facility]  [address] [place], [place] [address] [facility], [address]      NAME: Trae Bush  AGE: 62 y o  SEX: female  : 1960   MRN: [id number]    DATE: 2020  TIME: 6:23 PM    Assessment and Plan     Problem List Items Addressed This Visit        Genitourinary    Microscopic hematuria    Relevant Orders    Urinalysis with microscopic       Other    Anxiety    Hypercholesterolemia    Relevant Orders    Lipid panel    Comprehensive metabolic panel      Other Visit Diagnoses     Well adult health check    -  Primary    Relevant Medications    Cholecalciferol (VITAMIN D3) 48 MCG (2000) capsule    Encounter for screening mammogram for breast cancer        Relevant Orders    Mammo screening bilateral w 3d & cad    Overweight with body mass index (BMI) 25 0-29 9        Cough        Relevant Orders    XR chest pa & lateral          Patient Instructions      Reviewed health history along with medication, she is doing well with sertraline 50 mg daily, she will continue with this as is  We did review previous blood work,   She is up to date with Lipid screening  Her cholesterol 1 year ago had risen significantly to 272 with HDL 64,   Slip given to redo fasting blood work, if LDL greater than 190 we will start statin  She will continue to walk for exercise, watch healthy diet  She will try to lose 5-10 lb  Without factoring in LDL her ASCVD 10 year risk calculator is 4 3%   She is up to date with Diabetes screening  Immunization History   Administered Date(s) Administered    INFLUENZA 09/11/2013    Influenza Quadrivalent, 6-35 Months IM 10/24/2015    Influenza TIV (IM) 09/17/2012, 10/01/2013, 10/20/2014, 10/20/2015    Influenza, recombinant, quadrivalent,injectable, preservative free 12/03/2018    Td (adult), adsorbed 09/17/2012    Tdap 11/11/2014    Tuberculin Skin Test-PPD Intradermal 06/18/2012, 06/20/2012     She did no do Flu shot this season -  Should do yearly in the Fall  Tdap/tetanus shot is up to date  (done every 10 yrs for superficial cuts, every 5 yrs for deep wounds)   Can also look into coverage for new shingles shot, Shingrix  Can do that at pharmacy  Was never a smoker -   She does complain of few months tickle/irritation in throat, coughs with eating  GERD symptoms very rarely  She will do chest x-ray  If symptoms are continuing she can see ENT  Regarding Colon Cancer screening,    Screening is up to date  (   Had colonoscopy September 2018  )    She does see her Gynecologist routinely  Had Pap test in January 2019, also had seen them in November 2019  Kike Martinez Discussed screening Mammogram, this is  ordered  Last was in July 2018  Hepatitis C Screening indicated for 'baby boomers' -     previously perfomed and was negative  We will see her again in 12 months, sooner as needed  Chief Complaint     Chief Complaint   Patient presents with    Annual Exam       History of Present Illness   Rich Salvador is a 62y o -year-old female who is in for a yearly check, she relates she has been feeling well, continues to see her gyn  Had seen them with some vaginal bleeding back in November, that resolved  Pap testing has been okay  Previous issue with adnexal mass has been stable, she has discuss this with them      She has noted some difficulty with tickle in throat, cough originating in throat recently, especially notes this with eating, denies any aspiration or acid reflux  She is doing well with sertraline 50  Review of Systems   Review of Systems   Constitutional: Negative for activity change, appetite change, chills, fatigue, fever and unexpected weight change  HENT: Negative for mouth sores, sore throat and trouble swallowing  Eyes: Negative for visual disturbance  Respiratory: Positive for cough  Negative for choking, chest tightness and shortness of breath  Cardiovascular: Negative for chest pain, palpitations and leg swelling  Gastrointestinal: Negative for abdominal pain, blood in stool, constipation, diarrhea, nausea and vomiting  No acid reflux     No change in bowel   Genitourinary: Negative for dysuria and hematuria  Neurological: Negative for dizziness, syncope, light-headedness and headaches  Hematological: Does not bruise/bleed easily  Psychiatric/Behavioral: Negative for behavioral problems, confusion (Mild cognitive impairment as before) and sleep disturbance  Active Problem List     Patient Active Problem List   Diagnosis    Adnexal mass    Allergic rhinitis    Anxiety    Contrast media adverse reaction    Hypercholesterolemia    Post traumatic stress disorder    Microscopic hematuria    ILANA (stress urinary incontinence, female)       Past Medical History:  Reviewed    Past Surgical History:  Reviewed    Family History:  Reviewed    Social History:  Reviewed    Objective     Vitals:    01/13/20 1532   BP: 130/70   Pulse: 56   SpO2: 98%   Weight: 69 9 kg (154 lb)   Height: 5' 3" (1 6 m)     Body mass index is 27 28 kg/m²      BP Readings from Last 3 Encounters:   01/13/20 130/70   12/03/18 136/84   06/19/18 136/80       Wt Readings from Last 3 Encounters:   01/13/20 69 9 kg (154 lb)   12/03/18 67 1 kg (148 lb)   06/19/18 65 5 kg (144 lb 6 4 oz)       Physical Exam   Constitutional: She is oriented to person, place, and time  She appears well-developed and well-nourished  No distress  HENT:   Mouth/Throat: Oropharynx is clear and moist  No oropharyngeal exudate  TM clear   Eyes: Conjunctivae are normal  No scleral icterus  Cardiovascular: Normal rate, regular rhythm and normal heart sounds  No murmur heard  No carotid bruit   Pulmonary/Chest: Effort normal and breath sounds normal  No respiratory distress  Abdominal: Soft  There is no tenderness  Musculoskeletal: She exhibits no edema  Lymphadenopathy:     She has no cervical adenopathy  Neurological: She is alert and oriented to person, place, and time  Psychiatric: She has a normal mood and affect  Her behavior is normal        ALLERGIES:  Allergies   Allergen Reactions    Gadolinium Anaphylaxis    Codeine Syncope    Amoxicillin Rash    Iodinated Diagnostic Agents Hives and Itching     [facility] - [id number]: throat itching    Metrizamide Hives and Itching       Current Medications     Current Outpatient Medications   Medication Sig Dispense Refill    fexofenadine (ALLEGRA) 180 MG tablet Take 180 mg by mouth daily      Multiple Vitamins-Minerals (ONE-A-DAY 50 PLUS PO) Take 1 tablet by mouth daily      Omega-3 Fatty Acids (OMEGA 3 PO) Take 500 mg by mouth daily Once daily      sertraline (ZOLOFT) 50 mg tablet TAKE 1 TABLET BY MOUTH DAILY  90 tablet 0    Calcium Carb-Cholecalciferol (CALCIUM 600 + D PO) Take 1 tablet by mouth daily      Cholecalciferol (VITAMIN D3) 50 MCG (2000 UT) capsule Take 1 capsule (2,000 Units total) by mouth daily       No current facility-administered medications for this visit               Orders Placed This Encounter   Procedures    Mammo screening bilateral w 3d & cad    XR chest pa & lateral    Lipid panel    Comprehensive metabolic panel    Urinalysis with microscopic         Austin Silver DO

## 2020-03-06 ENCOUNTER — TELEPHONE (OUTPATIENT)
Dept: OTHER | Facility: OTHER | Age: 60
End: 2020-03-06

## 2020-03-06 NOTE — TELEPHONE ENCOUNTER
Please call pt back on cell to discuss Blood test results   She called at 80 and Beny Beth advised pt that someone will call back at a later time

## 2021-01-14 ENCOUNTER — OFFICE VISIT (OUTPATIENT)
Dept: FAMILY MEDICINE CLINIC | Facility: CLINIC | Age: 61
End: 2021-01-14
Payer: COMMERCIAL

## 2021-01-14 VITALS
TEMPERATURE: 97.7 F | WEIGHT: 149 LBS | SYSTOLIC BLOOD PRESSURE: 138 MMHG | BODY MASS INDEX: 26.4 KG/M2 | DIASTOLIC BLOOD PRESSURE: 80 MMHG | OXYGEN SATURATION: 99 % | HEART RATE: 70 BPM | HEIGHT: 63 IN

## 2021-01-14 DIAGNOSIS — R31.29 MICROSCOPIC HEMATURIA: ICD-10-CM

## 2021-01-14 DIAGNOSIS — Z12.31 BREAST CANCER SCREENING BY MAMMOGRAM: ICD-10-CM

## 2021-01-14 DIAGNOSIS — E78.00 HYPERCHOLESTEROLEMIA: ICD-10-CM

## 2021-01-14 DIAGNOSIS — Z00.00 ENCOUNTER FOR ANNUAL PHYSICAL EXAM: Primary | ICD-10-CM

## 2021-01-14 PROCEDURE — 3725F SCREEN DEPRESSION PERFORMED: CPT | Performed by: FAMILY MEDICINE

## 2021-01-14 PROCEDURE — 3008F BODY MASS INDEX DOCD: CPT | Performed by: FAMILY MEDICINE

## 2021-01-14 PROCEDURE — 1036F TOBACCO NON-USER: CPT | Performed by: FAMILY MEDICINE

## 2021-01-14 PROCEDURE — 99396 PREV VISIT EST AGE 40-64: CPT | Performed by: FAMILY MEDICINE

## 2021-01-14 RX ORDER — ROSUVASTATIN CALCIUM 5 MG/1
5 TABLET, COATED ORAL 3 TIMES WEEKLY
Qty: 45 TABLET | Refills: 3 | Status: SHIPPED | OUTPATIENT
Start: 2021-01-15 | End: 2022-01-17 | Stop reason: SDUPTHER
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
## 2021-01-14 NOTE — PATIENT INSTRUCTIONS
She is doing well    We did review previous blood work, her cholesterol last year was 258 with HDL 45,   Previous LDL over 200  She is agreeable to start Crestor 5 mg 3 times weekly, her  uses this  She will redo fasting blood work along with urinalysis in March  Last urinalysis was clear, history microscopic hematuria  Blood pressure today after sitting is acceptable, continue to exercise routinely, watch healthy diet  She did stop sertraline back in June, she is doing well off of this, can stay off  She is up to date with Diabetes screening  Immunization History   Administered Date(s) Administered    INFLUENZA 09/11/2013, 10/28/2020    Influenza Quadrivalent, 6-35 Months IM 10/24/2015    Influenza, recombinant, quadrivalent,injectable, preservative free 12/03/2018    Influenza, seasonal, injectable 09/17/2012, 10/01/2013, 10/20/2014, 10/20/2015    Td (adult), adsorbed 09/17/2012    Tdap 11/11/2014    Tuberculin Skin Test-PPD Intradermal 06/18/2012, 06/20/2012       She does do yearly Flu shot  Tdap/tetanus shot is up to date  (done every 10 yrs for superficial cuts, every 5 yrs for deep wounds)   Can also look into coverage for new shingles shot, Shingrix  Can do that here at pharmacy  Was never a smoker     Regarding Colon Cancer screening,    Screening is up to date  She did have a colonoscopy in September of 2018  She does see her Gynecologist routinely  Discussed screening Mammogram, this is  ordered  Hepatitis C Screening indicated for 'baby boomers' -    previously perfomed and was negative  Continue to try to watch healthy diet, exercise routinely  We will see her again in 12 months, sooner as needed

## 2021-05-20 ENCOUNTER — HOSPITAL ENCOUNTER (OUTPATIENT)
Dept: MAMMOGRAPHY | Facility: MEDICAL CENTER | Age: 61
Discharge: HOME | End: 2021-05-20
Payer: COMMERCIAL

## 2021-05-20 VITALS — BODY MASS INDEX: 26.4 KG/M2 | HEIGHT: 63 IN | WEIGHT: 149 LBS

## 2021-05-20 DIAGNOSIS — Z12.31 BREAST CANCER SCREENING BY MAMMOGRAM: ICD-10-CM

## 2021-05-20 PROCEDURE — 77063 BREAST TOMOSYNTHESIS BI: CPT

## 2021-05-20 PROCEDURE — 77067 SCR MAMMO BI INCL CAD: CPT

## 2021-06-18 ENCOUNTER — HOSPITAL ENCOUNTER (OUTPATIENT)
Dept: MAMMOGRAPHY | Facility: CLINIC | Age: 61
Discharge: HOME | End: 2021-06-18
Payer: COMMERCIAL

## 2021-06-18 ENCOUNTER — HOSPITAL ENCOUNTER (OUTPATIENT)
Dept: ULTRASOUND IMAGING | Facility: CLINIC | Age: 61
Discharge: HOME | End: 2021-06-18
Payer: COMMERCIAL

## 2021-06-18 VITALS — BODY MASS INDEX: 26.4 KG/M2 | HEIGHT: 63 IN | WEIGHT: 149 LBS

## 2021-06-18 DIAGNOSIS — R92.8 ABNORMAL MAMMOGRAM: ICD-10-CM

## 2021-06-18 PROCEDURE — G0279 TOMOSYNTHESIS, MAMMO: HCPCS

## 2021-06-18 PROCEDURE — 76642 ULTRASOUND BREAST LIMITED: CPT

## 2021-06-18 PROCEDURE — 77065 DX MAMMO INCL CAD UNI: CPT

## 2021-12-02 ENCOUNTER — OFFICE VISIT (OUTPATIENT)
Dept: FAMILY MEDICINE CLINIC | Facility: CLINIC | Age: 61
End: 2021-12-02
Payer: COMMERCIAL

## 2021-12-02 ENCOUNTER — LAB (OUTPATIENT)
Dept: LAB | Facility: MEDICAL CENTER | Age: 61
End: 2021-12-02
Payer: COMMERCIAL

## 2021-12-02 VITALS
SYSTOLIC BLOOD PRESSURE: 136 MMHG | BODY MASS INDEX: 26.75 KG/M2 | OXYGEN SATURATION: 100 % | WEIGHT: 151 LBS | TEMPERATURE: 98.6 F | DIASTOLIC BLOOD PRESSURE: 72 MMHG | RESPIRATION RATE: 16 BRPM | HEIGHT: 63 IN | HEART RATE: 91 BPM

## 2021-12-02 DIAGNOSIS — L30.9 DERMATITIS: Primary | ICD-10-CM

## 2021-12-02 DIAGNOSIS — L30.9 DERMATITIS: ICD-10-CM

## 2021-12-02 DIAGNOSIS — R70.0 ELEVATED SED RATE: ICD-10-CM

## 2021-12-02 DIAGNOSIS — R77.9 ELEVATED BLOOD PROTEIN: ICD-10-CM

## 2021-12-02 LAB
ALBUMIN SERPL BCP-MCNC: 3.9 G/DL (ref 3.5–5)
ALP SERPL-CCNC: 84 U/L (ref 46–116)
ALT SERPL W P-5'-P-CCNC: 55 U/L (ref 12–78)
ANION GAP SERPL CALCULATED.3IONS-SCNC: 3 MMOL/L (ref 4–13)
AST SERPL W P-5'-P-CCNC: 29 U/L (ref 5–45)
BASOPHILS # BLD AUTO: 0.01 THOUSANDS/ΜL (ref 0–0.1)
BASOPHILS NFR BLD AUTO: 0 % (ref 0–1)
BILIRUB SERPL-MCNC: 0.54 MG/DL (ref 0.2–1)
BUN SERPL-MCNC: 21 MG/DL (ref 5–25)
CALCIUM SERPL-MCNC: 9.9 MG/DL (ref 8.3–10.1)
CHLORIDE SERPL-SCNC: 104 MMOL/L (ref 100–108)
CO2 SERPL-SCNC: 29 MMOL/L (ref 21–32)
CREAT SERPL-MCNC: 0.98 MG/DL (ref 0.6–1.3)
EOSINOPHIL # BLD AUTO: 0.15 THOUSAND/ΜL (ref 0–0.61)
EOSINOPHIL NFR BLD AUTO: 3 % (ref 0–6)
ERYTHROCYTE [DISTWIDTH] IN BLOOD BY AUTOMATED COUNT: 12.9 % (ref 11.6–15.1)
ERYTHROCYTE [SEDIMENTATION RATE] IN BLOOD: 45 MM/HOUR (ref 0–29)
GFR SERPL CREATININE-BSD FRML MDRD: 62 ML/MIN/1.73SQ M
GLUCOSE SERPL-MCNC: 89 MG/DL (ref 65–140)
HCT VFR BLD AUTO: 44.5 % (ref 34.8–46.1)
HGB BLD-MCNC: 14.2 G/DL (ref 11.5–15.4)
IMM GRANULOCYTES # BLD AUTO: 0.04 THOUSAND/UL (ref 0–0.2)
IMM GRANULOCYTES NFR BLD AUTO: 1 % (ref 0–2)
LIPASE SERPL-CCNC: 195 U/L (ref 73–393)
LYMPHOCYTES # BLD AUTO: 0.91 THOUSANDS/ΜL (ref 0.6–4.47)
LYMPHOCYTES NFR BLD AUTO: 16 % (ref 14–44)
MCH RBC QN AUTO: 28.3 PG (ref 26.8–34.3)
MCHC RBC AUTO-ENTMCNC: 31.9 G/DL (ref 31.4–37.4)
MCV RBC AUTO: 89 FL (ref 82–98)
MONOCYTES # BLD AUTO: 0.68 THOUSAND/ΜL (ref 0.17–1.22)
MONOCYTES NFR BLD AUTO: 12 % (ref 4–12)
NEUTROPHILS # BLD AUTO: 3.92 THOUSANDS/ΜL (ref 1.85–7.62)
NEUTS SEG NFR BLD AUTO: 68 % (ref 43–75)
NRBC BLD AUTO-RTO: 0 /100 WBCS
PLATELET # BLD AUTO: 285 THOUSANDS/UL (ref 149–390)
PMV BLD AUTO: 10.3 FL (ref 8.9–12.7)
POTASSIUM SERPL-SCNC: 4.5 MMOL/L (ref 3.5–5.3)
PROT SERPL-MCNC: 8.6 G/DL (ref 6.4–8.2)
RBC # BLD AUTO: 5.01 MILLION/UL (ref 3.81–5.12)
SODIUM SERPL-SCNC: 136 MMOL/L (ref 136–145)
WBC # BLD AUTO: 5.71 THOUSAND/UL (ref 4.31–10.16)

## 2021-12-02 PROCEDURE — 80053 COMPREHEN METABOLIC PANEL: CPT

## 2021-12-02 PROCEDURE — 85025 COMPLETE CBC W/AUTO DIFF WBC: CPT

## 2021-12-02 PROCEDURE — 36415 COLL VENOUS BLD VENIPUNCTURE: CPT

## 2021-12-02 PROCEDURE — 83690 ASSAY OF LIPASE: CPT

## 2021-12-02 PROCEDURE — 80074 ACUTE HEPATITIS PANEL: CPT

## 2021-12-02 PROCEDURE — 3008F BODY MASS INDEX DOCD: CPT | Performed by: FAMILY MEDICINE

## 2021-12-02 PROCEDURE — 99214 OFFICE O/P EST MOD 30 MIN: CPT | Performed by: FAMILY MEDICINE

## 2021-12-02 PROCEDURE — 85652 RBC SED RATE AUTOMATED: CPT

## 2021-12-02 PROCEDURE — 1036F TOBACCO NON-USER: CPT | Performed by: FAMILY MEDICINE

## 2021-12-02 RX ORDER — METHYLPREDNISOLONE 4 MG/1
TABLET ORAL
Qty: 21 EACH | Refills: 0 | Status: SHIPPED | OUTPATIENT
Start: 2021-12-02 | End: 2021-12-10 | Stop reason: ALTCHOICE

## 2021-12-03 LAB
HAV IGM SER QL: NORMAL
HBV CORE IGM SER QL: NORMAL
HBV SURFACE AG SER QL: NORMAL
HCV AB SER QL: NORMAL

## 2021-12-20 ENCOUNTER — HOSPITAL ENCOUNTER (OUTPATIENT)
Dept: MAMMOGRAPHY | Facility: CLINIC | Age: 61
Discharge: HOME | End: 2021-12-20
Payer: COMMERCIAL

## 2021-12-20 DIAGNOSIS — R92.8 ABNORMAL MAMMOGRAM: ICD-10-CM

## 2021-12-20 PROCEDURE — G0279 TOMOSYNTHESIS, MAMMO: HCPCS

## 2021-12-20 PROCEDURE — 77065 DX MAMMO INCL CAD UNI: CPT

## 2022-01-11 ENCOUNTER — LAB (OUTPATIENT)
Dept: LAB | Facility: MEDICAL CENTER | Age: 62
End: 2022-01-11
Payer: COMMERCIAL

## 2022-01-11 DIAGNOSIS — R77.9 ELEVATED BLOOD PROTEIN: ICD-10-CM

## 2022-01-11 LAB
ALBUMIN SERPL BCP-MCNC: 4.1 G/DL (ref 3.5–5)
ALP SERPL-CCNC: 70 U/L (ref 46–116)
ALT SERPL W P-5'-P-CCNC: 35 U/L (ref 12–78)
ANION GAP SERPL CALCULATED.3IONS-SCNC: 3 MMOL/L (ref 4–13)
AST SERPL W P-5'-P-CCNC: 25 U/L (ref 5–45)
BILIRUB SERPL-MCNC: 0.4 MG/DL (ref 0.2–1)
BUN SERPL-MCNC: 24 MG/DL (ref 5–25)
CALCIUM ALBUM COR SERPL-MCNC: 9 MG/DL (ref 8.3–10.1)
CALCIUM SERPL-MCNC: 9.1 MG/DL (ref 8.3–10.1)
CHLORIDE SERPL-SCNC: 104 MMOL/L (ref 100–108)
CO2 SERPL-SCNC: 30 MMOL/L (ref 21–32)
CREAT SERPL-MCNC: 0.73 MG/DL (ref 0.6–1.3)
GFR SERPL CREATININE-BSD FRML MDRD: 87 ML/MIN/1.73SQ M
GLUCOSE SERPL-MCNC: 78 MG/DL (ref 65–140)
POTASSIUM SERPL-SCNC: 4.3 MMOL/L (ref 3.5–5.3)
PROT SERPL-MCNC: 8.3 G/DL (ref 6.4–8.2)
SODIUM SERPL-SCNC: 137 MMOL/L (ref 136–145)

## 2022-01-11 PROCEDURE — 36415 COLL VENOUS BLD VENIPUNCTURE: CPT

## 2022-01-11 PROCEDURE — 80053 COMPREHEN METABOLIC PANEL: CPT

## 2022-01-12 NOTE — RESULT ENCOUNTER NOTE
Repeat bloodwork - to recheck total protein - shows that level is now just about normal  No need to repeat testing at this time  Would recommend routine followup with pcp when due

## 2022-01-17 ENCOUNTER — OFFICE VISIT (OUTPATIENT)
Dept: FAMILY MEDICINE CLINIC | Facility: CLINIC | Age: 62
End: 2022-01-17
Payer: COMMERCIAL

## 2022-01-17 VITALS
HEIGHT: 63 IN | DIASTOLIC BLOOD PRESSURE: 64 MMHG | BODY MASS INDEX: 26.4 KG/M2 | TEMPERATURE: 98.2 F | OXYGEN SATURATION: 98 % | WEIGHT: 149 LBS | SYSTOLIC BLOOD PRESSURE: 144 MMHG | HEART RATE: 82 BPM

## 2022-01-17 DIAGNOSIS — Z00.00 ENCOUNTER FOR ANNUAL PHYSICAL EXAM: Primary | ICD-10-CM

## 2022-01-17 DIAGNOSIS — E78.00 HYPERCHOLESTEROLEMIA: ICD-10-CM

## 2022-01-17 DIAGNOSIS — L20.9 ATOPIC DERMATITIS, UNSPECIFIED TYPE: ICD-10-CM

## 2022-01-17 DIAGNOSIS — Z88.9 MULTIPLE ALLERGIES: ICD-10-CM

## 2022-01-17 PROCEDURE — 3725F SCREEN DEPRESSION PERFORMED: CPT | Performed by: FAMILY MEDICINE

## 2022-01-17 PROCEDURE — 99396 PREV VISIT EST AGE 40-64: CPT | Performed by: FAMILY MEDICINE

## 2022-01-17 PROCEDURE — 1036F TOBACCO NON-USER: CPT | Performed by: FAMILY MEDICINE

## 2022-01-17 PROCEDURE — 3008F BODY MASS INDEX DOCD: CPT | Performed by: FAMILY MEDICINE

## 2022-01-17 RX ORDER — ROSUVASTATIN CALCIUM 5 MG/1
5 TABLET, COATED ORAL 3 TIMES WEEKLY
Qty: 45 TABLET | Refills: 3 | Status: SHIPPED | OUTPATIENT
Start: 2022-01-17

## 2022-01-17 RX ORDER — CETIRIZINE HYDROCHLORIDE 10 MG/1
10 TABLET ORAL EVERY EVENING
Qty: 30 TABLET | Refills: 1 | Status: SHIPPED | OUTPATIENT
Start: 2022-01-17

## 2022-01-17 NOTE — PROGRESS NOTES
FAMILY PRACTICE OFFICE VISIT  [facility]  [address]  [address], [place], [address]      NAME: Trae Bush  AGE: 59 y o  SEX: female  : 1960   MRN: [id number]    DATE: 2022  TIME: 3:25 PM    Assessment and Plan     Problem List Items Addressed This Visit     Hypercholesterolemia    Relevant Medications    rosuvastatin (CRESTOR) 5 mg tablet    Other Relevant Orders    Lipid panel      Other Visit Diagnoses     Encounter for annual physical exam    -  Primary    BMI 26        Atopic dermatitis, pruritic rash        Relevant Medications    cetirizine (ZyrTEC) 10 mg tablet    Multiple allergies -see testing  w allergist        Relevant Medications    cetirizine (ZyrTEC) 10 mg tablet          Patient Instructions     Reviewed health history along with medication, she had been seen our office 1 month ago with a 3 week generalized rash, did use Medrol pack, rash had resolved for few weeks, now has recurred  She will use cetirizine/Zyrtec 10 mg every evening, I would like her to use good skin moisturizer twice daily for the next week, if not improving she may need another course of steroid  She also can set up with Dermatology for evaluation  Note she had seen allergist in the past, back in  she had multiple positive skin tests  Systolic blood pressure borderline here today, she does monitor this at home, has been okay  We did review previous blood work, recent CMP/CBC looked okay  She is up to date with Lipid screening  Last year we started Crestor 5 mg 3 times weekly, cholesterol in March had dropped to 218 with HDL 49,   I would like her to redo lipid panel in the next few months/at least by     She is up to date with Diabetes screening    Recent glucose excellent at 78       Immunization History   Administered Date(s) Administered    COVID-19 J&J (Carol) vaccine 0 5 mL 04/10/2021    INFLUENZA 09/11/2013, 10/28/2020, 11/03/2021    Influenza Quadrivalent, 6-35 Months IM 10/24/2015    Influenza, injectable, quadrivalent, preservative free 0 5 mL 11/05/2020    Influenza, recombinant, quadrivalent,injectable, preservative free 12/03/2018    Influenza, seasonal, injectable 09/17/2012, 10/01/2013, 10/20/2014, 10/20/2015    Td (adult), adsorbed 09/17/2012    Tdap 11/11/2014    Tuberculin Skin Test-PPD Intradermal 06/18/2012, 06/20/2012     She does do yearly Flu shot  Did at pharmacy  Tdap/tetanus shot is up to date  (done every 10 yrs for superficial cuts, every 5 yrs for deep wounds)   Can also look into coverage for new shingles shot, Shingrix  Can do that here or at pharmacy  Covid vaccine J&J x 1 -  Can do booster      Was never a smoker     Regarding Colon Cancer screening, screening is up-to-date, she did have a colonoscopy in September of 2018       She does see her Gynecologist routinely  Past history microscopic hematuria, urinalysis was okay back in March of 2021  Discussed screening Mammogram, this is up-to-date  She had her redo mammogram in December, appeared benign but was advised to redo bilateral diagnostic mammogram in 6 months  She does have an order placed for that  Regarding Hepatitis C Screening -   previously perfomed and was negative  Continue to try to watch healthy diet, exercise routinely  We will see her again in 12 months, sooner as needed  Chief Complaint     Chief Complaint   Patient presents with    Physical Exam     c/o itching upper body, last month took steroid, was better then rash is coming back       History of Present Illness   Casper Hall is a 59y o -year-old female who is in for a routine yearly physical, she has been feeling well other than rash she had been in with 1 month ago has recurred, upper torso, upper arms, upper back, neck in to chin  Very itchy  Had responded to Medrol, returned for about 1 week    No known trigger, does have multiple allergies, had seen allergist years ago  Review of Systems   Review of Systems   Constitutional: Negative for appetite change, fatigue, fever and unexpected weight change  HENT: Negative for sore throat and trouble swallowing  Respiratory: Negative for cough, chest tightness, shortness of breath and wheezing  Cardiovascular: Negative for chest pain, palpitations and leg swelling  Gastrointestinal: Negative for abdominal pain, blood in stool, nausea and vomiting  No acid reflux     No change in bowel   Genitourinary: Negative for dysuria and hematuria  She sees gyn   Skin: Positive for rash  Neurological: Negative for dizziness, syncope, light-headedness and headaches  Hematological: Does not bruise/bleed easily  Psychiatric/Behavioral: Negative for behavioral problems and confusion  Active Problem List     Patient Active Problem List   Diagnosis    Adnexal mass    Allergic rhinitis    Anxiety    Contrast media adverse reaction    Hypercholesterolemia    Post traumatic stress disorder    Microscopic hematuria    ILANA (stress urinary incontinence, female)       Past Medical History:  Reviewed    Past Surgical History:  Reviewed    Family History:  Reviewed    Social History:  Reviewed    Objective     Vitals:    01/17/22 1457   BP: 144/64   BP Location: Left arm   Patient Position: Sitting   Cuff Size: Standard   Pulse: 82   Temp: 98 2 °F (36 8 °C)   SpO2: 98%   Weight: 67 6 kg (149 lb)   Height: 5' 3" (1 6 m)     Body mass index is 26 39 kg/m²  BP Readings from Last 3 Encounters:   01/17/22 144/64   12/02/21 136/72   01/14/21 138/80       Wt Readings from Last 3 Encounters:   01/17/22 67 6 kg (149 lb)   12/02/21 68 5 kg (151 lb)   06/18/21 67 6 kg (149 lb)       Physical Exam  Constitutional:       General: She is not in acute distress  Appearance: Normal appearance  She is well-developed  She is not ill-appearing     Eyes:      General: No scleral icterus  Neck:      Vascular: No carotid bruit  Cardiovascular:      Rate and Rhythm: Normal rate and regular rhythm  Heart sounds: Normal heart sounds  No murmur heard  Pulmonary:      Effort: Pulmonary effort is normal  No respiratory distress  Breath sounds: Normal breath sounds  Abdominal:      Palpations: Abdomen is soft  Tenderness: There is no abdominal tenderness  Musculoskeletal:      Right lower leg: No edema  Left lower leg: No edema  Lymphadenopathy:      Cervical: No cervical adenopathy  Skin:     Coloration: Skin is not jaundiced  Comments: Diffuse redness upper torso, neck,   Neurological:      Mental Status: She is alert and oriented to person, place, and time  Psychiatric:         Mood and Affect: Mood normal          Behavior: Behavior normal          ALLERGIES:  Allergies   Allergen Reactions    Gadolinium Anaphylaxis    Codeine Syncope    Amoxicillin Rash    Iodinated Diagnostic Agents Hives and Itching     Annotation - [id number]: throat itching    Metrizamide Hives and Itching       Current Medications     Current Outpatient Medications   Medication Sig Dispense Refill    Calcium Carb-Cholecalciferol (CALCIUM 600 + D PO) Take 1 tablet by mouth daily      Cholecalciferol (VITAMIN D3) 50 MCG (2000 UT) capsule Take 1 capsule (2,000 Units total) by mouth daily      Multiple Vitamins-Minerals (ONE-A-DAY 50 PLUS PO) Take 1 tablet by mouth daily      Omega-3 Fatty Acids (OMEGA 3 PO) Take 500 mg by mouth daily Once daily      rosuvastatin (CRESTOR) 5 mg tablet Take 1 tablet (5 mg total) by mouth 3 (three) times a week 45 tablet 3    cetirizine (ZyrTEC) 10 mg tablet Take 1 tablet (10 mg total) by mouth every evening ( for itch /rash ) 30 tablet 1     No current facility-administered medications for this visit              Orders Placed This Encounter   Procedures    Lipid panel         Jia Mcknight DO

## 2022-01-17 NOTE — PATIENT INSTRUCTIONS
Reviewed health history along with medication, she had been seen our office 1 month ago with a 3 week generalized rash, did use Medrol pack, rash had resolved for few weeks, now has recurred  She will use cetirizine/Zyrtec 10 mg every evening, I would like her to use good skin moisturizer twice daily for the next week, if not improving she may need another course of steroid  She also can set up with Dermatology for evaluation  Note she had seen allergist in the past, back in 2012 she had multiple positive skin tests  Systolic blood pressure borderline here today, she does monitor this at home, has been okay  We did review previous blood work, recent CMP/CBC looked okay  She is up to date with Lipid screening  Last year we started Crestor 5 mg 3 times weekly, cholesterol in March had dropped to 218 with HDL 49,   I would like her to redo lipid panel in the next few months/at least by June    She is up to date with Diabetes screening  Recent glucose excellent at 78       Immunization History   Administered Date(s) Administered    COVID-19 J&J ([place]) vaccine 0 5 mL 04/10/2021    INFLUENZA 09/11/2013, 10/28/2020, 11/03/2021    Influenza Quadrivalent, 6-35 Months IM 10/24/2015    Influenza, injectable, quadrivalent, preservative free 0 5 mL 11/05/2020    Influenza, recombinant, quadrivalent,injectable, preservative free 12/03/2018    Influenza, seasonal, injectable 09/17/2012, 10/01/2013, 10/20/2014, 10/20/2015    Td (adult), adsorbed 09/17/2012    Tdap 11/11/2014    Tuberculin Skin Test-PPD Intradermal 06/18/2012, 06/20/2012     She does do yearly Flu shot  Did at pharmacy  Tdap/tetanus shot is up to date  (done every 10 yrs for superficial cuts, every 5 yrs for deep wounds)   Can also look into coverage for new shingles shot, Shingrix  Can do that here or at pharmacy    Covid vaccine J&J x 1 -  Can do booster      Was never a smoker     Regarding Colon Cancer screening, screening is up-to-date, she did have a colonoscopy in September of 2018       She does see her Gynecologist routinely  Past history microscopic hematuria, urinalysis was okay back in March of 2021  Discussed screening Mammogram, this is up-to-date  She had her redo mammogram in December, appeared benign but was advised to redo bilateral diagnostic mammogram in 6 months  She does have an order placed for that  Regarding Hepatitis C Screening -   previously perfomed and was negative  Continue to try to watch healthy diet, exercise routinely  We will see her again in 12 months, sooner as needed

## 2022-02-07 PROBLEM — L93.2 CUTANEOUS LUPUS ERYTHEMATOSUS: Status: ACTIVE | Noted: 2022-02-07

## 2022-05-23 ENCOUNTER — RA CDI HCC (OUTPATIENT)
Dept: OTHER | Facility: HOSPITAL | Age: 62
End: 2022-05-23

## 2022-05-23 NOTE — PROGRESS NOTES
[facility]  coding opportunities       Chart reviewed, no opportunity found: CHART REVIEWED, NO OPPORTUNITY FOUND        Patients Insurance        Commercial Insurance: [address] [place] [address]

## 2022-06-20 ENCOUNTER — HOSPITAL ENCOUNTER (OUTPATIENT)
Dept: MAMMOGRAPHY | Facility: CLINIC | Age: 62
Discharge: HOME | End: 2022-06-20
Payer: COMMERCIAL

## 2022-06-20 VITALS — WEIGHT: 149 LBS | HEIGHT: 63 IN | BODY MASS INDEX: 26.4 KG/M2

## 2022-06-20 DIAGNOSIS — R92.8 ABNORMAL MAMMOGRAM: ICD-10-CM

## 2022-06-20 PROCEDURE — 77066 DX MAMMO INCL CAD BI: CPT

## 2022-06-20 PROCEDURE — G0279 TOMOSYNTHESIS, MAMMO: HCPCS

## 2022-07-18 PROBLEM — IMO0002 LUPUS: Status: ACTIVE | Noted: 2022-05-09

## 2022-07-18 PROBLEM — M32.9 LUPUS (HCC): Status: ACTIVE | Noted: 2022-05-09

## 2022-07-18 RX ORDER — HYDROXYCHLOROQUINE SULFATE 200 MG/1
200 TABLET, FILM COATED ORAL 2 TIMES DAILY
COMMUNITY
Start: 2022-05-06 | End: 2022-07-20

## 2022-07-18 RX ORDER — TRIAMCINOLONE ACETONIDE 1 MG/G
CREAM TOPICAL
COMMUNITY
Start: 2022-01-21 | End: 2023-01-24 | Stop reason: ALTCHOICE

## 2022-07-18 RX ORDER — MYCOPHENOLATE MOFETIL 500 MG/1
1000 TABLET ORAL 2 TIMES DAILY
COMMUNITY
Start: 2022-06-12 | End: 2023-08-01 | Stop reason: SDUPTHER

## 2022-07-18 RX ORDER — PIMECROLIMUS 10 MG/G
CREAM TOPICAL
COMMUNITY
Start: 2022-07-11 | End: 2023-01-24 | Stop reason: ALTCHOICE

## 2022-07-18 RX ORDER — PREDNISONE 5 MG/1
TABLET ORAL
COMMUNITY
Start: 2022-05-18 | End: 2022-06-08 | Stop reason: ALTCHOICE

## 2023-01-24 ENCOUNTER — OFFICE VISIT (OUTPATIENT)
Dept: FAMILY MEDICINE CLINIC | Facility: CLINIC | Age: 63
End: 2023-01-24

## 2023-01-24 VITALS
BODY MASS INDEX: 25.34 KG/M2 | SYSTOLIC BLOOD PRESSURE: 134 MMHG | DIASTOLIC BLOOD PRESSURE: 52 MMHG | TEMPERATURE: 98 F | OXYGEN SATURATION: 98 % | HEIGHT: 63 IN | HEART RATE: 68 BPM | WEIGHT: 143 LBS

## 2023-01-24 DIAGNOSIS — Z91.89 PNEUMOCOCCAL VACCINATION INDICATED: ICD-10-CM

## 2023-01-24 DIAGNOSIS — F41.9 ANXIETY: ICD-10-CM

## 2023-01-24 DIAGNOSIS — D84.821 IMMUNOSUPPRESSION DUE TO DRUG THERAPY (HCC): ICD-10-CM

## 2023-01-24 DIAGNOSIS — Z00.00 ENCOUNTER FOR ANNUAL PHYSICAL EXAM: Primary | ICD-10-CM

## 2023-01-24 DIAGNOSIS — E78.00 HYPERCHOLESTEROLEMIA: ICD-10-CM

## 2023-01-24 DIAGNOSIS — Z12.31 SCREENING MAMMOGRAM, ENCOUNTER FOR: ICD-10-CM

## 2023-01-24 DIAGNOSIS — F33.1 MODERATE EPISODE OF RECURRENT MAJOR DEPRESSIVE DISORDER (HCC): ICD-10-CM

## 2023-01-24 DIAGNOSIS — L93.2 CUTANEOUS LUPUS ERYTHEMATOSUS: ICD-10-CM

## 2023-01-24 DIAGNOSIS — Z79.899 IMMUNOSUPPRESSION DUE TO DRUG THERAPY (HCC): ICD-10-CM

## 2023-01-24 RX ORDER — ESCITALOPRAM OXALATE 5 MG/1
5 TABLET ORAL EVERY MORNING
Qty: 90 TABLET | Refills: 1 | Status: SHIPPED | OUTPATIENT
Start: 2023-01-24

## 2023-01-24 RX ORDER — ROSUVASTATIN CALCIUM 5 MG/1
5 TABLET, COATED ORAL 3 TIMES WEEKLY
Qty: 45 TABLET | Refills: 3 | Status: SHIPPED | OUTPATIENT
Start: 2023-01-25

## 2023-01-24 NOTE — PROGRESS NOTES
BMI Counseling: Body mass index is 25 33 kg/m²  The BMI is above normal  Nutrition recommendations include encouraging healthy choices of fruits and vegetables  Exercise recommendations include exercising 3-5 times per week  Rationale for BMI follow-up plan is due to patient being overweight or obese  FAMILY PRACTICE OFFICE VISIT  Vern[facility] Robert GARZA O  [facility]  [address]  [address], [place], [address]      NAME: Trae Bush  AGE: 57 y o  SEX: female  : 1960   MRN: [id number]    DATE: 2023  TIME: 12:30 PM    Assessment and Plan     Problem List Items Addressed This Visit     Cutaneous lupus erythematosus    Relevant Orders    Pneumococcal Conjugate Vaccine 20-valent (Pcv20) (Completed)    Anxiety    Relevant Medications    escitalopram (LEXAPRO) 5 mg tablet    Other Relevant Orders    TSH, 3rd generation with Free T4 reflex    Moderate episode of recurrent major depressive disorder (HCC)    Relevant Medications    escitalopram (LEXAPRO) 5 mg tablet    Other Relevant Orders    TSH, 3rd generation with Free T4 reflex    Hypercholesterolemia    Relevant Medications    rosuvastatin (CRESTOR) 5 mg tablet (Start on 2023)    Other Relevant Orders    Lipid panel   Other Visit Diagnoses     Encounter for annual physical exam    -  Primary    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 ([facility] [address] )        Relevant Orders    Pneumococcal Conjugate Vaccine 20-valent (Pcv20) (Completed)    Pneumococcal vaccination indicated        Relevant Orders    Pneumococcal Conjugate Vaccine 20-valent (Pcv20) (Completed)    BMI 25 0-25 9,adult        Screening mammogram, encounter for        Relevant Orders    Mammo screening bilateral w 3d & cad          Patient Instructions     Reviewed health history along with medications, since I last saw her she was diagnosed with cutaneous lupus, she was intolerant of Plaquenil, had GI effects  She is currently on CellCept  She relates her dose was dropped to 1000 twice daily recently as WBC had dropped, recent redo CBC shows WBC 3 2, hemoglobin 13 6  She will continue to see rheumatology along with dermatology  She has had some increased depressive symptoms, PHQ-9 today was 1+8+3, she had done well with low-dose escitalopram/Lexapro in the past, she will use 5 mg every morning, she can use half pill for the first week  She should continue to walk for exercise as tolerated  Blood pressure acceptable here today, her CMP in December was fine, nonfasting glucose 113, urinalysis was clear  I would like her to include fasting lipids along with TSH when she does her next blood work for specialists    She will continue on Crestor 5 mg 3 times weekly     Immunization History   Administered Date(s) Administered   • COVID-19 J&J ([place]) vaccine 0 5 mL 04/10/2021   • INFLUENZA 09/11/2013, 10/28/2020, 11/03/2021, 11/22/2022   • Influenza Quadrivalent, 6-35 Months IM 10/24/2015   • Influenza, injectable, quadrivalent, preservative free 0 5 mL 11/05/2020   • Influenza, recombinant, quadrivalent,injectable, preservative free 12/03/2018   • Influenza, seasonal, injectable 09/17/2012, 10/01/2013, 10/20/2014, 10/20/2015   • Td (adult), adsorbed 09/17/2012   • Tdap 11/11/2014   • Tuberculin Skin Test-PPD Intradermal 06/18/2012, 06/20/2012     Pneumococcal 20 vaccine given here today  She does do yearly Flu shot  Tdap/tetanus shot is up to date  (done every 10 yrs for superficial cuts, every 5 yrs for deep wounds)   Can also look into coverage for 'shingles' shot, Shingrix  Can do that at pharmacy  Covid vaccine received x1, she does plan to do booster with her rheumatologist       Was never a smoker     Regarding Colon Cancer screening,    Screening is up to date  Her colonoscopy with [facility] in September 2018 was negative, redo 10 years  She does see her Gynecologist routinely    She [facility] with Pap test, had ultrasound of pelvis last year, her mammogram was fine back in June  Regarding Hepatitis C Screening - previously perfomed and was negative  Continue to try to watch healthy diet, exercise routinely  We will see her again in 6 months, sooner as needed  Chief Complaint     Chief Complaint   Patient presents with   • Physical Exam       History of Present Illness   Harshal Bush is a 57y o -year-old female who is in today for a yearly checkup, she has been seeing dermatology along with rheumatology regarding cutaneous lupus, currently on CellCept  Dose was recently decreased due to low WBC  She has had no fevers, chills or other sign of infection  She has noted some increased depressive symptoms/anxiety, previously had done well with Lexapro, wants to restart  Review of Systems   Review of Systems   Constitutional: Positive for fatigue  Negative for activity change (walks), appetite change, fever and unexpected weight change (down few lbs watching diet)  HENT: Negative for sore throat and trouble swallowing  Respiratory: Negative for cough, chest tightness, shortness of breath and wheezing  Cardiovascular: Negative for chest pain, palpitations and leg swelling  Gastrointestinal: Negative for abdominal pain, blood in stool, nausea and vomiting  No acid reflux     No change in bowel   Genitourinary: Negative for dysuria and hematuria  Saw Gyn last yr      occ incont -  Uses pad   Musculoskeletal: Positive for arthralgias  Skin: Positive for rash (improved w Cellcept -  had GI effects w Plaquenil)  Neurological: Negative for dizziness, syncope, light-headedness and headaches  Hematological: Does not bruise/bleed easily  Psychiatric/Behavioral: Positive for dysphoric mood and sleep disturbance  Negative for confusion  The patient is nervous/anxious          Active Problem List     Patient Active Problem List   Diagnosis   • Adnexal mass   • Allergic rhinitis   • Anxiety   • Contrast media adverse reaction   • Hypercholesterolemia   • Post traumatic stress disorder   • Microscopic hematuria   • ILANA (stress urinary incontinence, female)   • Cutaneous lupus erythematosus   • Moderate episode of recurrent major depressive disorder (HCC)       Past Medical History:  Reviewed    Past Surgical History:  Reviewed    Family History:  Reviewed    Social History:  Reviewed    Objective     Vitals:    01/24/23 1006   BP: 134/52   BP Location: Left arm   Patient Position: Sitting   Cuff Size: Standard   Pulse: 68   Temp: 98 °F (36 7 °C)   SpO2: 98%   Weight: 64 9 kg (143 lb)   Height: 5' 3" (1 6 m)     Body mass index is 25 33 kg/m²  BP Readings from Last 3 Encounters:   01/24/23 134/52   01/17/22 144/64   12/02/21 136/72       Wt Readings from Last 3 Encounters:   01/24/23 64 9 kg (143 lb)   06/20/22 67 6 kg (149 lb)   01/17/22 67 6 kg (149 lb)       Physical Exam  Constitutional:       General: She is not in acute distress  Appearance: Normal appearance  She is well-developed  She is not ill-appearing  HENT:      Right Ear: Tympanic membrane normal       Left Ear: Tympanic membrane normal       Mouth/Throat:      Pharynx: Oropharynx is clear  Eyes:      General: No scleral icterus  Neck:      Vascular: No carotid bruit  Cardiovascular:      Rate and Rhythm: Normal rate and regular rhythm  Heart sounds: Normal heart sounds  No murmur heard  Pulmonary:      Effort: Pulmonary effort is normal  No respiratory distress  Breath sounds: Normal breath sounds  No wheezing, rhonchi or rales  Abdominal:      Tenderness: There is no abdominal tenderness  Musculoskeletal:      Right lower leg: No edema  Left lower leg: No edema  Lymphadenopathy:      Cervical: No cervical adenopathy  Skin:     Coloration: Skin is not jaundiced  Neurological:      General: No focal deficit present  Mental Status: She is alert and oriented to person, place, and time  Psychiatric:         Mood and Affect: Mood normal          Behavior: Behavior normal         PHQ-2/9 Depression Screening    Little interest or pleasure in doing things: 2 - more than half the days  Feeling down, depressed, or hopeless: 2 - more than half the days  Trouble falling or staying asleep, or sleeping too much: 2 - more than half the days  Feeling tired or having little energy: 3 - nearly every day  Poor appetite or overeatin - not at all  Feeling bad about yourself - or that you are a failure or have let yourself or your family down: 2 - more than half the days  Trouble concentrating on things, such as reading the newspaper or watching television: 0 - not at all  Moving or speaking so slowly that other people could have noticed   Or the opposite - being so fidgety or restless that you have been moving around a lot more than usual: 0 - not at all  Thoughts that you would be better off dead, or of hurting yourself in some way: 1 - several days  PHQ-2 Score: 4  PHQ-2 Interpretation: POSITIVE depression screen  PHQ-9 Score: 12   PHQ-9 Interpretation: Moderate depression        ALLERGIES:  Allergies   Allergen Reactions   • Gadolinium Anaphylaxis   • Codeine Syncope   • Plaquenil [Hydroxychloroquine] GI Intolerance   • Amoxicillin Rash   • Iodinated Contrast Media Hives and Itching     [facility] [id number]: throat itching   • Metrizamide Hives and Itching       Current Medications     Current Outpatient Medications   Medication Sig Dispense Refill   • Calcium Carb-Cholecalciferol (CALCIUM 600 + D PO) Take 1 tablet by mouth daily     • Cholecalciferol (VITAMIN D3) 50 MCG (2000 UT) capsule Take 1 capsule (2,000 Units total) by mouth daily     • escitalopram (LEXAPRO) 5 mg tablet Take 1 tablet (5 mg total) by mouth every morning 90 tablet 1   • Multiple Vitamins-Minerals (ONE-A-DAY 50 PLUS PO) Take 1 tablet by mouth daily     • mycophenolate (CELLCEPT) 500 mg tablet Take 1,000 mg by mouth 2 (two) times a day     • Omega-3 Fatty Acids (OMEGA 3 PO) Take 500 mg by mouth daily Once daily     • [START ON 1/25/2023] rosuvastatin (CRESTOR) 5 mg tablet Take 1 tablet (5 mg total) by mouth 3 (three) times a week 45 tablet 3     No current facility-administered medications for this visit              Orders Placed This Encounter   Procedures   • Mammo screening bilateral w 3d & cad   • Pneumococcal Conjugate Vaccine 20-valent (Pcv20)   • Lipid panel   • TSH, 3rd generation with Free T4 reflex         Teresa Wong DO

## 2023-01-24 NOTE — PATIENT INSTRUCTIONS
Reviewed health history along with medications, since I last saw her she was diagnosed with cutaneous lupus, she was intolerant of Plaquenil, had GI effects  She is currently on CellCept  She relates her dose was dropped to 1000 twice daily recently as WBC had dropped, recent redo CBC shows WBC 3 2, hemoglobin 13 6  She will continue to see rheumatology along with dermatology  She has had some increased depressive symptoms, PHQ-9 today was 1+8+3, she had done well with low-dose escitalopram/Lexapro in the past, she will use 5 mg every morning, she can use half pill for the first week  She should continue to walk for exercise as tolerated  Blood pressure acceptable here today, her CMP in December was fine, nonfasting glucose 113, urinalysis was clear  I would like her to include fasting lipids along with TSH when she does her next blood work for specialists    She will continue on Crestor 5 mg 3 times weekly     Immunization History   Administered Date(s) Administered    COVID-19 J&J ([employer]) vaccine 0 5 mL 04/10/2021    INFLUENZA 09/11/2013, 10/28/2020, 11/03/2021, 11/22/2022    Influenza Quadrivalent, 6-35 Months IM 10/24/2015    Influenza, injectable, quadrivalent, preservative free 0 5 mL 11/05/2020    Influenza, recombinant, quadrivalent,injectable, preservative free 12/03/2018    Influenza, seasonal, injectable 09/17/2012, 10/01/2013, 10/20/2014, 10/20/2015    Td (adult), adsorbed 09/17/2012    Tdap 11/11/2014    Tuberculin Skin Test-PPD Intradermal 06/18/2012, 06/20/2012     Pneumococcal 20 vaccine given here today  She does do yearly Flu shot  Tdap/tetanus shot is up to date  (done every 10 yrs for superficial cuts, every 5 yrs for deep wounds)   Can also look into coverage for 'shingles' shot, Shingrix  Can do that at pharmacy    Covid vaccine received x1, she does plan to do booster with her rheumatologist       Was never a smoker     Regarding Colon Cancer screening,    Screening is up to date   Her colonoscopy with [facility] in September 2018 was negative, redo 10 years  She does see her Gynecologist routinely  She saw [facility] year with Pap test, had ultrasound of pelvis last year, her mammogram was fine back in June  Regarding Hepatitis C Screening - previously perfomed and was negative  Continue to try to watch healthy diet, exercise routinely  We will see her again in 6 months, sooner as needed

## 2023-06-21 ENCOUNTER — HOSPITAL ENCOUNTER (OUTPATIENT)
Dept: MAMMOGRAPHY | Facility: MEDICAL CENTER | Age: 63
Discharge: HOME | End: 2023-06-21
Payer: COMMERCIAL

## 2023-06-21 VITALS — BODY MASS INDEX: 25.35 KG/M2 | WEIGHT: 143.08 LBS | HEIGHT: 63 IN

## 2023-06-21 DIAGNOSIS — Z12.31 SCREENING MAMMOGRAM, ENCOUNTER FOR: ICD-10-CM

## 2023-06-21 PROCEDURE — 77067 SCR MAMMO BI INCL CAD: CPT

## 2023-06-21 PROCEDURE — 77063 BREAST TOMOSYNTHESIS BI: CPT

## 2023-07-17 ENCOUNTER — RA CDI HCC (OUTPATIENT)
Dept: OTHER | Facility: HOSPITAL | Age: 63
End: 2023-07-17

## 2023-07-17 NOTE — PROGRESS NOTES
[address] [facility] coding opportunities       Chart reviewed, no opportunity found: CHART REVIEWED, NO OPPORTUNITY FOUND        Patients Insurance        Commercial Insurance: [employer]

## 2023-07-25 ENCOUNTER — TELEPHONE (OUTPATIENT)
Dept: FAMILY MEDICINE CLINIC | Facility: CLINIC | Age: 63
End: 2023-07-25

## 2023-07-25 NOTE — TELEPHONE ENCOUNTER
Pt called to inform she missed her appt because she is in the hospital. Pt has shingles. She will reschedule when she gets better.

## 2023-07-28 ENCOUNTER — OFFICE VISIT (OUTPATIENT)
Dept: FAMILY MEDICINE CLINIC | Facility: CLINIC | Age: 63
End: 2023-07-28
Payer: COMMERCIAL

## 2023-07-28 VITALS
SYSTOLIC BLOOD PRESSURE: 140 MMHG | BODY MASS INDEX: 25.44 KG/M2 | WEIGHT: 143.6 LBS | HEART RATE: 68 BPM | OXYGEN SATURATION: 96 % | DIASTOLIC BLOOD PRESSURE: 82 MMHG

## 2023-07-28 DIAGNOSIS — B02.7 DISSEMINATED HERPES ZOSTER: Primary | ICD-10-CM

## 2023-07-28 PROCEDURE — 99214 OFFICE O/P EST MOD 30 MIN: CPT | Performed by: INTERNAL MEDICINE

## 2023-07-28 NOTE — PROGRESS NOTES
Assessment/Plan:    Disseminated herpes zoster  She got recent episode of disseminated shingles, she was admitted to the hospital treated with IV acyclovir, switch to oral valacyclovir. Still reports significant pain in her right upper extremity at the area of the rash which she has started to crust.  Reports continuation of nausea but no vomiting. Denies any chills or fevers. Discussed with her that she will finish course of valacyclovir continue with Tylenol as needed for pain control. FMLA forms filled out during the visit. If any worsening of the symptoms she will let me know immediately. Diagnoses and all orders for this visit:    Disseminated herpes zoster          Subjective:      Patient ID: Nasreen Dean is a 70 y.o. female. Patient came today for follow-up after recent episode of disseminated shingles. She was admitted to the hospital, treated with IV acyclovir. Also brought FMLA forms for me to fill out that was done during the visit. The following portions of the patient's history were reviewed and updated as appropriate: allergies, current medications, past family history, past medical history, past social history, past surgical history, and problem list.    Review of Systems   Constitutional: Negative for chills and fever. Cardiovascular: Negative for palpitations. Gastrointestinal: Positive for nausea. Negative for vomiting. Musculoskeletal: Positive for arthralgias and myalgias. Skin: Positive for rash. Neurological: Negative for headaches.          Objective:      /82 (BP Location: Right arm, Patient Position: Sitting, Cuff Size: Standard)   Pulse 68   Wt 65.1 kg (143 lb 9.6 oz)   SpO2 96%   BMI 25.44 kg/m²     Allergies   Allergen Reactions   • Gadolinium Anaphylaxis   • Codeine Syncope   • Plaquenil [Hydroxychloroquine] GI Intolerance   • Amoxicillin Rash   • Iodinated Contrast Media Hives and Itching     [facility] [id number]: throat itching   • Metrizamide Hives and Itching          Current Outpatient Medications:   •  Cholecalciferol (VITAMIN D3) 50 MCG (2000 UT) capsule, Take 1 capsule (2,000 Units total) by mouth daily, Disp: , Rfl:   •  escitalopram (LEXAPRO) 5 mg tablet, Take 1 tablet (5 mg total) by mouth every morning, Disp: 90 tablet, Rfl: 1  •  Multiple Vitamins-Minerals (ONE-A-DAY 50 PLUS PO), Take 1 tablet by mouth daily, Disp: , Rfl:   •  Omega-3 Fatty Acids (OMEGA 3 PO), Take 500 mg by mouth daily Once daily, Disp: , Rfl:   •  rosuvastatin (CRESTOR) 5 mg tablet, Take 1 tablet (5 mg total) by mouth 3 (three) times a week, Disp: 45 tablet, Rfl: 3  •  Calcium Carb-Cholecalciferol (CALCIUM 600 + D PO), Take 1 tablet by mouth daily (Patient not taking: Reported on 7/28/2023), Disp: , Rfl:   •  meloxicam (MOBIC) 7.5 mg tablet, take 1 tablet by mouth once daily with food or milk if needed MODERATE PAIN (Patient not taking: Reported on 7/28/2023), Disp: , Rfl:   No current facility-administered medications for this visit. There are no Patient Instructions on file for this visit. Physical Exam  Constitutional:       General: She is not in acute distress. Appearance: She is not toxic-appearing. Cardiovascular:      Rate and Rhythm: Normal rate. Pulses: Normal pulses. Pulmonary:      Effort: Pulmonary effort is normal.   Skin:     Findings: Rash present. Neurological:      General: No focal deficit present. Cranial Nerves: No cranial nerve deficit. Motor: No weakness.    Psychiatric:         Mood and Affect: Mood normal.

## 2023-07-28 NOTE — ASSESSMENT & PLAN NOTE
She got recent episode of disseminated shingles, she was admitted to the hospital treated with IV acyclovir, switch to oral valacyclovir. Still reports significant pain in her right upper extremity at the area of the rash which she has started to crust.  Reports continuation of nausea but no vomiting. Denies any chills or fevers. Discussed with her that she will finish course of valacyclovir continue with Tylenol as needed for pain control. FMLA forms filled out during the visit. If any worsening of the symptoms she will let me know immediately.

## 2023-08-01 ENCOUNTER — TELEPHONE (OUTPATIENT)
Dept: FAMILY MEDICINE CLINIC | Facility: CLINIC | Age: 63
End: 2023-08-01

## 2023-08-01 ENCOUNTER — OFFICE VISIT (OUTPATIENT)
Dept: FAMILY MEDICINE CLINIC | Facility: CLINIC | Age: 63
End: 2023-08-01
Payer: COMMERCIAL

## 2023-08-01 VITALS
HEIGHT: 63 IN | BODY MASS INDEX: 25.5 KG/M2 | TEMPERATURE: 97.1 F | HEART RATE: 79 BPM | OXYGEN SATURATION: 100 % | WEIGHT: 143.9 LBS | SYSTOLIC BLOOD PRESSURE: 120 MMHG | DIASTOLIC BLOOD PRESSURE: 70 MMHG

## 2023-08-01 DIAGNOSIS — R20.2 NUMBNESS AND TINGLING OF RIGHT HAND: ICD-10-CM

## 2023-08-01 DIAGNOSIS — B02.29 POSTHERPETIC NEURALGIA: Primary | ICD-10-CM

## 2023-08-01 DIAGNOSIS — L93.2 CUTANEOUS LUPUS ERYTHEMATOSUS: ICD-10-CM

## 2023-08-01 DIAGNOSIS — R20.0 NUMBNESS AND TINGLING OF RIGHT HAND: ICD-10-CM

## 2023-08-01 DIAGNOSIS — B02.7 DISSEMINATED HERPES ZOSTER: ICD-10-CM

## 2023-08-01 PROCEDURE — 99212 OFFICE O/P EST SF 10 MIN: CPT | Performed by: FAMILY MEDICINE

## 2023-08-01 RX ORDER — MYCOPHENOLATE MOFETIL 500 MG/1
1000 TABLET ORAL 2 TIMES DAILY
Qty: 1 TABLET | Refills: 0
Start: 2023-08-01

## 2023-08-01 RX ORDER — OXYCODONE HYDROCHLORIDE 5 MG/1
TABLET ORAL
COMMUNITY
Start: 2023-07-23 | End: 2023-08-01 | Stop reason: ALTCHOICE

## 2023-08-01 RX ORDER — GABAPENTIN 300 MG/1
300 CAPSULE ORAL
Qty: 30 CAPSULE | Refills: 0 | Status: SHIPPED | OUTPATIENT
Start: 2023-08-01 | End: 2023-08-07 | Stop reason: SDUPTHER

## 2023-08-01 NOTE — PATIENT INSTRUCTIONS
Reviewed hospital evaluation along with last visit here with Dr Matt Phillip July 28. She does continue with significant postherpetic neuralgia/pain at night. I would like her to use gabapentin 300 mg once at night until pain resolves. Watch for mental fogginess in the morning. Moisturize skin as needed. She has been out of work since July 24, she plans to return to work on August 11 as long as pain improves, right hand numbness with swelling goes down. CellCept has been on hold since the hospital, she should touch base with dermatology/rheumatology regarding restarting in few weeks. She last used prednisone last year. Hospital CBC was okay, CMP was okay other than mild elevation LFTs, at discharge ALT 86. LFTs were fine back in May. Back in February TSH was okay at 1.5, cholesterol was 187 with HDL 57, . She placed Escitalopram 5 mg on hold recently, restart if notes increased anxiety. Should continue Rosuvastatin 5 mg 3 times weekly long-term.

## 2023-08-01 NOTE — PROGRESS NOTES
FAMILY PRACTICE OFFICE VISIT  Zara Kevin D.O. [address] [facility]  [address] [place] [address].  [place], [place], [employer]      NAME: Trae Bush  AGE: 70 y.o. SEX: female  : 1960   MRN: [id number]    DATE: 2023  TIME: 1:27 PM    Assessment and Plan     Problem List Items Addressed This Visit     Cutaneous lupus erythematosus    Relevant Medications    mycophenolate (CELLCEPT) 500 mg tablet    Herpes zoster right arm    Postherpetic neuralgia - Primary    Relevant Medications    gabapentin (NEURONTIN) 300 mg capsule   Other Visit Diagnoses     Numbness and tingling of right hand              Patient Instructions   Reviewed hospital evaluation along with last visit here with Dr Winston Kang . She does continue with significant postherpetic neuralgia/pain at night. I would like her to use gabapentin 300 mg once at night until pain resolves. Watch for mental fogginess in the morning. Moisturize skin as needed. She has been out of work since , she plans to return to work on  as long as pain improves, right hand numbness with swelling goes down. CellCept has been on hold since the hospital, she should touch base with dermatology/rheumatology regarding restarting in few weeks. She last used prednisone last year. Hospital CBC was okay, CMP was okay other than mild elevation LFTs, at discharge ALT 86. LFTs were fine back in May. Back in February TSH was okay at 1.5, cholesterol was 187 with HDL 57, . She placed Escitalopram 5 mg on hold recently, restart if notes increased anxiety. Should continue Rosuvastatin 5 mg 3 times weekly long-term.       Chief Complaint     Chief Complaint   Patient presents with   • Herpes Zoster       History of Present Illness   Hola Bush is a 70y.o.-year-old female who is in to recheck shingles, she been scheduled for a regular appointment with me the other day but was hospitalized at [facility] due to pain.  Rash upper back along with inner right arm. She has completed Valtrex 1 g 3 times daily x1 week. She had prescription for oxycodone 5 mg 15 pills from the hospital, she had used that up, little benefit. She feels the pain is tolerable during the day, fairly severe at night. Difficulty with sleep  Does note tingling with numbness right hand    Otherwise has been feeling okay. Note she did stop CellCept at the hospital, she is on that through dermatology for lupus. Also sees rheumatology    She did hold Lexapro recently    Has been out of work since July 24       Review of Systems   Review of Systems   Constitutional: Negative for appetite change, fatigue, fever and unexpected weight change. HENT: Negative for sore throat and trouble swallowing. Respiratory: Negative for cough, chest tightness and shortness of breath. Cardiovascular: Negative for chest pain, palpitations and leg swelling. Gastrointestinal: Negative for abdominal pain, blood in stool, nausea and vomiting. No acid reflux     No change in bowel   Genitourinary: Negative for dysuria and hematuria. Neurological: Negative for dizziness, syncope, light-headedness and headaches. Psychiatric/Behavioral: Negative for behavioral problems and confusion.        Active Problem List     Patient Active Problem List   Diagnosis   • Adnexal mass   • Allergic rhinitis   • Anxiety   • Contrast media adverse reaction   • Hypercholesterolemia   • Post traumatic stress disorder   • Microscopic hematuria   • ILANA (stress urinary incontinence, female)   • Cutaneous lupus erythematosus   • Moderate episode of recurrent major depressive disorder (HCC)   • Herpes zoster right arm   • Postherpetic neuralgia       Past Medical History:  Reviewed    Past Surgical History:  Reviewed    Family History:  Reviewed    Social History:  Reviewed    Objective     Vitals:    08/01/23 1304   BP: 120/70   BP Location: Left arm   Patient Position: Sitting   Cuff Size: Large   Pulse: 79   Temp: (!) 97.1 °F (36.2 °C)   TempSrc: Tympanic   SpO2: 100%   Weight: 65.3 kg (143 lb 14.4 oz)   Height: 5' 3" (1.6 m)     Body mass index is 25.49 kg/m². BP Readings from Last 3 Encounters:   08/01/23 120/70   07/28/23 140/82   01/24/23 134/52       Wt Readings from Last 3 Encounters:   08/01/23 65.3 kg (143 lb 14.4 oz)   07/28/23 65.1 kg (143 lb 9.6 oz)   06/21/23 64.9 kg (143 lb 1.3 oz)       Physical Exam  Constitutional:       Comments: She looks well other than has diffuse rash consistent with healing zoster right suprascapular along with inner right arm to wrist, few lesions hand, does have decreased sensation with decreased  due to swelling right hand, no signs of secondary infection.    Psychiatric:         Behavior: Behavior normal.         ALLERGIES:  Allergies   Allergen Reactions   • Gadolinium Anaphylaxis   • Codeine Syncope   • Plaquenil [Hydroxychloroquine] GI Intolerance   • Amoxicillin Rash   • Iodinated Contrast Media Hives and Itching     Annotation - [id number]: throat itching   • Metrizamide Hives and Itching       Current Medications     Current Outpatient Medications   Medication Sig Dispense Refill   • Calcium Carb-Cholecalciferol (CALCIUM 600 + D PO) Take 1 tablet by mouth daily     • Cholecalciferol (VITAMIN D3) 50 MCG (2000 UT) capsule Take 1 capsule (2,000 Units total) by mouth daily     • escitalopram (LEXAPRO) 5 mg tablet Take 1 tablet (5 mg total) by mouth every morning 90 tablet 1   • gabapentin (NEURONTIN) 300 mg capsule Take 1 capsule (300 mg total) by mouth daily at bedtime 30 capsule 0   • Multiple Vitamins-Minerals (ONE-A-DAY 50 PLUS PO) Take 1 tablet by mouth daily     • mycophenolate (CELLCEPT) 500 mg tablet Take 2 tablets (1,000 mg total) by mouth 2 (two) times a day 1 tablet 0   • Omega-3 Fatty Acids (OMEGA 3 PO) Take 500 mg by mouth daily Once daily     • rosuvastatin (CRESTOR) 5 mg tablet Take 1 tablet (5 mg total) by mouth 3 (three) times a week 45 tablet 3   • meloxicam (MOBIC) 7.5 mg tablet take 1 tablet by mouth once daily with food or milk if needed MODERATE PAIN (Patient not taking: Reported on 7/28/2023)       No current facility-administered medications for this visit. No orders of the defined types were placed in this encounter.         Nuha Navarrete DO

## 2023-08-01 NOTE — TELEPHONE ENCOUNTER
Kiesha, [place]  for the PT, called in to verify that the PT did have a TCM visit with her PCP after her hospital admit on 7/27/23. PT's TCM was 7/28/23. Kiesha requested the 7/28 TCM visit to be faxed to her [phone].

## 2023-08-02 ENCOUNTER — TELEPHONE (OUTPATIENT)
Dept: FAMILY MEDICINE CLINIC | Facility: CLINIC | Age: 63
End: 2023-08-02

## 2023-08-02 NOTE — TELEPHONE ENCOUNTER
Patient called in because her pain is worse today than it was yesterday. She would like something rx to relieve her of this.  Please advise

## 2023-08-02 NOTE — TELEPHONE ENCOUNTER
Please call her asap -  She can use Gabapentin 300 mg I gave yesterday during daytime and at bedtime -   If still not working she should use 1 in AM, 1 in afternoon and 1 at night

## 2023-08-07 ENCOUNTER — TELEPHONE (OUTPATIENT)
Dept: FAMILY MEDICINE CLINIC | Facility: CLINIC | Age: 63
End: 2023-08-07

## 2023-08-07 DIAGNOSIS — B02.29 POSTHERPETIC NEURALGIA: ICD-10-CM

## 2023-08-07 RX ORDER — GABAPENTIN 300 MG/1
300 CAPSULE ORAL 4 TIMES DAILY
Qty: 120 CAPSULE | Refills: 0 | Status: SHIPPED | OUTPATIENT
Start: 2023-08-07

## 2023-08-07 NOTE — TELEPHONE ENCOUNTER
I called and spoke to her, she notes increased pain at night, she is now using gabapentin 300 mg in the morning, noon time, at 6 PM.  She will add an extra 300 mg at 8 to 9:00 in the evening, total of 4 pills daily. New prescription was sent in. She had been scheduled to return to work August 14/next Monday, she does continue with pain and swelling right hand, with ongoing postherpetic neuralgia we will extend her time out of work at least 1 more week, tentative return to work August 21.   She will come into the office next week for a visit/recheck with me

## 2023-08-13 PROBLEM — B02.7 DISSEMINATED HERPES ZOSTER: Status: RESOLVED | Noted: 2023-07-28 | Resolved: 2023-08-13

## 2023-08-13 RX ORDER — KETOCONAZOLE 20 MG/G
CREAM TOPICAL
COMMUNITY
Start: 2023-07-22

## 2023-08-14 ENCOUNTER — OFFICE VISIT (OUTPATIENT)
Dept: FAMILY MEDICINE CLINIC | Facility: CLINIC | Age: 63
End: 2023-08-14
Payer: COMMERCIAL

## 2023-08-14 VITALS
HEART RATE: 83 BPM | BODY MASS INDEX: 25.34 KG/M2 | WEIGHT: 143 LBS | OXYGEN SATURATION: 99 % | DIASTOLIC BLOOD PRESSURE: 64 MMHG | SYSTOLIC BLOOD PRESSURE: 124 MMHG | HEIGHT: 63 IN | TEMPERATURE: 97.5 F

## 2023-08-14 DIAGNOSIS — B02.7 DISSEMINATED HERPES ZOSTER: ICD-10-CM

## 2023-08-14 DIAGNOSIS — B02.29 POSTHERPETIC NEURALGIA: Primary | ICD-10-CM

## 2023-08-14 DIAGNOSIS — G47.9 SLEEP DISTURBANCE: ICD-10-CM

## 2023-08-14 DIAGNOSIS — R29.898 RIGHT HAND WEAKNESS: ICD-10-CM

## 2023-08-14 PROCEDURE — 99213 OFFICE O/P EST LOW 20 MIN: CPT | Performed by: FAMILY MEDICINE

## 2023-08-14 RX ORDER — TRAZODONE HYDROCHLORIDE 50 MG/1
50 TABLET ORAL
Qty: 30 TABLET | Refills: 0 | Status: SHIPPED | OUTPATIENT
Start: 2023-08-14 | End: 2023-08-15 | Stop reason: SINTOL

## 2023-08-14 NOTE — LETTER
August 14, 2023     Patient: Deandre Gallegos  YOB: 1960  Date of Visit: 8/14/2023      To Whom it May Concern:    Deandre Gallegos is a patient in our family practice, she was seen today for reevaluation, due to ongoing pain with weakness right upper extremity associated with severe bout shingles she needs to remain out of work, she has been out of work since July 24. She will be doing physical therapy, we will reevaluate in 3 weeks.       Sincerely,          Bradly Davis DO        CC: No Recipients

## 2023-08-14 NOTE — PROGRESS NOTES
FAMILY PRACTICE OFFICE VISIT  Kate Kevin D.O. [address] [facility]  [address] [place] [address].  [place], [place], [facility]      NAME: Trae Bush  AGE: 70 y.o. SEX: female  : 1960   MRN: [id number]    DATE: 2023  TIME: 10:21 AM    Assessment and Plan     Problem List Items Addressed This Visit     Postherpetic neuralgia R Upper Extremity - Primary    RESOLVED: Herpes zoster right arm   Other Visit Diagnoses     Right hand weakness        Relevant Orders    Ambulatory Referral to PT/OT Hand Therapy    Sleep disturbance        Relevant Medications    traZODone (DESYREL) 50 mg tablet          Patient Instructions   Has had severe case zoster right upper extremity, pain and swelling right hand have led to nerve impingement, weakness right hand, has decreased fine motor along with decreased  right hand. I would like her to do hand therapy. For pain she will continue gabapentin 300 mg 4 times daily. Still has difficulty with pain at night, I would like her to add trazodone 50 mg at bedtime, if that is not working she can increase to 100 mg at bedtime, update us later this week regarding how that is helping. 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 She needs to remain out of work, she can drop off paperwork. We will reevaluate in 3 weeks. She relates she has remained off CellCept. Chief Complaint     Chief Complaint   Patient presents with   • Herpes Zoster       History of Present Illness   Dian Bush is a 70y.o.-year-old female who Is in for a reevaluation, continues with significant pain especially at night right upper extremity, notes inability to fully close right hand, weakness right hand.   Pain is rated as a "2-3" during the daytime but can run at a "10" overnight    Had tried oxycodone in the past without much initial benefit    Review of Systems   Review of Systems Constitutional:        Fevers or chills, pain originates suprascapular, radiates down hand, no other change in review of systems, no other weakness. Remains out of work       Active Problem List     Patient Active Problem List   Diagnosis   • Adnexal mass   • Allergic rhinitis   • Anxiety   • Contrast media adverse reaction   • Hypercholesterolemia   • Post traumatic stress disorder   • Microscopic hematuria   • ILANA (stress urinary incontinence, female)   • Cutaneous lupus erythematosus   • Moderate episode of recurrent major depressive disorder (HCC)   • Postherpetic neuralgia R Upper Extremity       Past Medical History:  Reviewed    Past Surgical History:  Reviewed    Family History:  Reviewed    Social History:  Reviewed    Objective     Vitals:    08/14/23 0944   BP: 124/64   BP Location: Left arm   Patient Position: Sitting   Cuff Size: Adult   Pulse: 83   Temp: 97.5 °F (36.4 °C)   TempSrc: Tympanic   SpO2: 99%   Weight: 64.9 kg (143 lb)   Height: 5' 3" (1.6 m)     Body mass index is 25.33 kg/m². BP Readings from Last 3 Encounters:   08/14/23 124/64   08/01/23 120/70   07/28/23 140/82       Wt Readings from Last 3 Encounters:   08/14/23 64.9 kg (143 lb)   08/01/23 65.3 kg (143 lb 14.4 oz)   07/28/23 65.1 kg (143 lb 9.6 oz)       Physical Exam  Constitutional:       Comments: She has significant decreased fine motor with inability to fully close right hand, healing zoster lesions down right arm to palm. Slight tenderness right suprascapular.          ALLERGIES:  Allergies   Allergen Reactions   • Gadolinium Anaphylaxis   • Codeine Syncope   • Plaquenil [Hydroxychloroquine] GI Intolerance   • Amoxicillin Rash   • Iodinated Contrast Media Hives and Itching     Annotation - [id number]: throat itching   • Metrizamide Hives and Itching       Current Medications     Current Outpatient Medications   Medication Sig Dispense Refill   • Calcium Carb-Cholecalciferol (CALCIUM 600 + D PO) Take 1 tablet by mouth daily • Cholecalciferol (VITAMIN D3) 50 MCG (2000 UT) capsule Take 1 capsule (2,000 Units total) by mouth daily     • escitalopram (LEXAPRO) 5 mg tablet Take 1 tablet (5 mg total) by mouth every morning 90 tablet 1   • gabapentin (NEURONTIN) 300 mg capsule Take 1 capsule (300 mg total) by mouth 4 (four) times a day ( for shingles pain in arm ) 120 capsule 0   • ketoconazole (NIZORAL) 2 % cream APPLY TOPICALLY DAILY FOR 14 DAYS     • meloxicam (MOBIC) 7.5 mg tablet take 1 tablet by mouth once daily with food or milk if needed MODERATE PAIN     • Multiple Vitamins-Minerals (ONE-A-DAY 50 PLUS PO) Take 1 tablet by mouth daily     • Omega-3 Fatty Acids (OMEGA 3 PO) Take 500 mg by mouth daily Once daily     • rosuvastatin (CRESTOR) 5 mg tablet Take 1 tablet (5 mg total) by mouth 3 (three) times a week 45 tablet 3   • traZODone (DESYREL) 50 mg tablet Take 1 tablet (50 mg total) by mouth daily at bedtime 30 tablet 0   • mycophenolate (CELLCEPT) 500 mg tablet Take 2 tablets (1,000 mg total) by mouth 2 (two) times a day 1 tablet 0     No current facility-administered medications for this visit.             Orders Placed This Encounter   Procedures   • Ambulatory Referral to PT/OT Hand Therapy         Dedra Mg DO

## 2023-08-14 NOTE — PATIENT INSTRUCTIONS
Has had severe case zoster right upper extremity, pain and swelling right hand have led to nerve impingement, weakness right hand, has decreased fine motor along with decreased  right hand. I would like her to do hand therapy. For pain she will continue gabapentin 300 mg 4 times daily. Still has difficulty with pain at night, I would like her to add trazodone 50 mg at bedtime, if that is not working she can increase to 100 mg at bedtime, update us later this week regarding how that is helping. 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 She needs to remain out of work, she can drop off paperwork. We will reevaluate in 3 weeks. She relates she has remained off CellCept.

## 2023-08-15 ENCOUNTER — TELEPHONE (OUTPATIENT)
Dept: FAMILY MEDICINE CLINIC | Facility: CLINIC | Age: 63
End: 2023-08-15

## 2023-08-15 DIAGNOSIS — B02.29 POSTHERPETIC NEURALGIA: Primary | ICD-10-CM

## 2023-08-15 RX ORDER — OXYCODONE HYDROCHLORIDE AND ACETAMINOPHEN 5; 325 MG/1; MG/1
TABLET ORAL
Qty: 15 TABLET | Refills: 0 | Status: SHIPPED | OUTPATIENT
Start: 2023-08-15

## 2023-08-15 NOTE — TELEPHONE ENCOUNTER
I called her, she was intolerant to trazodone, does not want to retry. She will continue gabapentin 300 mg 4 pills daily as is, she can use oxycodone/APAP 1 at bedtime, redo 4 to 6 hours later if needed, hopefully that will help with her nighttime pain, we did discuss controlled substance, watching for confusion, constipation.   She will start therapy for her hand on Monday, August 21

## 2023-08-15 NOTE — TELEPHONE ENCOUNTER
Date/Time: 8-15-23 / 8:37 AM     Pt called in to let you know she took the medication you told her to try for the first time last night, and it made her sick. She stated it made her nauseous  and gave her a funny feeling. She also stated it didn't help her sleep either.

## 2023-08-21 ENCOUNTER — EVALUATION (OUTPATIENT)
Dept: PHYSICAL THERAPY | Facility: MEDICAL CENTER | Age: 63
End: 2023-08-21
Payer: COMMERCIAL

## 2023-08-21 DIAGNOSIS — R29.898 RIGHT HAND WEAKNESS: Primary | ICD-10-CM

## 2023-08-21 PROCEDURE — 97161 PT EVAL LOW COMPLEX 20 MIN: CPT | Performed by: PHYSICAL THERAPIST

## 2023-08-21 PROCEDURE — 97140 MANUAL THERAPY 1/> REGIONS: CPT | Performed by: PHYSICAL THERAPIST

## 2023-08-21 NOTE — PROGRESS NOTES
PT Evaluation     Today's date: 2023  Patient name: Bryson Stinson  : 1960  MRN: [id number]  Referring provider: Lalito Mart DO  Dx:   Encounter Diagnosis     ICD-10-CM    1. 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 Pt presents with global moderate edema to R hand with healed scars along ulnar border of hand, wrist, and medial forearm. She has decreased protective sensation to her ulnar digits and diminished light touch to her radial digits. Pt has severe loss of active motion in her digits and thumb, wrists are WNL. She has decreased passive ROM in her middle digits, other digits are WNL with some extrinsic tightness in extensors 2/2 hand edema. Strength testing deferred. Pt should benefit from skilled PT to help reduce edema, improve ROM, increase strength, and better her overall functioning.  Thank you kindly for your referral.   Impairments: abnormal or restricted ROM, activity intolerance, impaired physical strength, lacks appropriate home exercise program and pain with function  Understanding of Dx/Px/POC: good   Prognosis: good    Goals  STG (2-3 weeks)  1: Pt independent in HEP  2: Improve AROM 10-15 degrees  3: improve thumb abd/ext 10 degrees  4: full PROM  5: decrease edema 25%    LTG (2-3 weeks)  1: Improve FOTO 10-15 pts  2: full active comp fist  3: Pt able to manipulate small objects without difficulty or fatigue  4: improve sensation to 0.2 g or better  5:  strength within 25 lbs of uninvolved side  6: pinch strength within 5 lbs of uninvolved side    Plan  Plan details: Initiate PT as per POC  Will reduce to 1x/week if copay becomes burdensome  Patient would benefit from: skilled physical therapy  Planned modality interventions: thermotherapy: hydrocollator packs and cryotherapy  Planned therapy interventions: motor coordination training, manual therapy, massage, nerve gliding, neuromuscular re-education, patient education, compression, flexibility, fine motor coordination training, functional ROM exercises, home exercise program, therapeutic activities, therapeutic exercise, stretching and strengthening  Frequency: 2x week  Duration in visits: 12  Duration in weeks: 6  Plan of Care beginning date: 2023  Plan of Care expiration date: 10/4/2023        Subjective Evaluation    History of Present Illness  Date of onset: 2023  Mechanism of injury: Shingles infection  Pain along ulnar hand into upper arm  "pulling pain"  + swelling  Difficult holding objects, even paper  Unable to move her fingers  + tingling, some recovery in sensation  Last night was the first night where the pain was better controlled            Not a recurrent problem   Quality of life: good    Patient Goals  Patient goals for therapy: increased strength, independence with ADLs/IADLs, decreased pain, increased motion, decreased edema, return to sport/leisure activities and return to work    Pain  Current pain ratin  At worst pain rating: 10  Quality: radiating, burning and pulling (tingling)  Relieving factors: rest, support and relaxation  Exacerbated by: any use of R hand.   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, fine motor using both hands)  Hand dominance: ambidextrous (write with R hand; forceful with L)  Life stress: sewing, cooking, cleaning          Objective     Observations     Additional Observation Details  Healed scars along ulnar border of hand, wrist, and forearm  Moderate global edema throughout R hand    Neurological Testing     Sensation     Wrist/Hand   Left   Intact: light touch    Right   Intact: light touch  Diminished: light touch    Comments   Right light touch: th 0.07g; 0.2 IF-MF, RF and SF 2 G    Active Range of Motion Left Wrist   Wrist flexion: 65 degrees   Wrist extension: 55 degrees   Radial deviation: 10 degrees   Ulnar deviation: 45 degrees     Right Wrist   Wrist flexion: 60 degrees   Wrist extension: 58 degrees   Radial deviation: 3 degrees   Ulnar deviation: 38 degrees     Left Thumb   Flexion     MP: 55 degrees    DIP: 40 degrees  Palmar Abduction     CMC: 60 degrees  Radial abduction    CMC: 60 degrees      Right Thumb   Flexion     MP: 25    DIP: 15  Palmar Abduction    CMC: 45  Radial Abduction    CMC: 40  Opposition: Thumb opposition    Right Digits   Flexion   Index     MCP: 20    PIP: 60    DIP: 15  Middle     MCP: 60    PIP: 55    DIP: 20  Ring     MCP: 45    PIP: 60    DIP: 25  Little     MCP: 30    PIP: 60    DIP: 20  Extension   Index     PIP: 10  Ring     PIP: 20  Little     PIP: 25    Additional Active Range of Motion Details  Sup/pron WNL    Passive Range of Motion     Right Thumb   Flexion     MP: 65    DIP: 35  Palmar Abduction     CMC: 55  Radial Abduction     CMC: 55    Right Digits   Flexion   Index     MCP: 80    PIP: 75    DIP: 65  Middle     MCP: 75    PIP: 80    DIP: 35  Ring     MCP: 70    PIP: 85    DIP: 30  Little     MCP: 90    PIP: 85    DIP: 80    Swelling     Left Wrist/Hand   Circumference MCP: 19.1 cm  Circumference wrist: 16.3 cm    Right Wrist/Hand   Circumference MCP: 19.8 cm  Circumference wrist: 16.6 cm             Precautions: DOI 7/24/23  HEP: PROM digits, TGE's, digit abd/add, thumb opp, thumb rad/palm abd, towel bunches      Manuals             MH in flexion             PROM digits                                       Neuro Re-Ed             TGE's             Pegs prehension             marbles             Grooved pegs             Nuts/bolts                                       Ther Ex             Towel bunches             Cones/pegs grasping                                                                                           Ther Activity Gait Training                                       Modalities

## 2023-08-23 ENCOUNTER — OFFICE VISIT (OUTPATIENT)
Dept: PHYSICAL THERAPY | Facility: MEDICAL CENTER | Age: 63
End: 2023-08-23
Payer: COMMERCIAL

## 2023-08-23 DIAGNOSIS — R29.898 RIGHT HAND WEAKNESS: Primary | ICD-10-CM

## 2023-08-23 PROCEDURE — 97112 NEUROMUSCULAR REEDUCATION: CPT | Performed by: PHYSICAL THERAPIST

## 2023-08-23 PROCEDURE — 97140 MANUAL THERAPY 1/> REGIONS: CPT | Performed by: PHYSICAL THERAPIST

## 2023-08-23 PROCEDURE — 97110 THERAPEUTIC EXERCISES: CPT | Performed by: PHYSICAL THERAPIST

## 2023-08-23 NOTE — PROGRESS NOTES
Daily Note     Today's date: 2023  Patient name: Mylene Olivas  : 1960  MRN: [id number]  Referring provider: Claire Porter DO  Dx:   Encounter Diagnosis     ICD-10-CM    1. Right hand weakness  R29.898                      Subjective: Pt reports she has been struggling with sleeping at night, pain is keeping her up. Medications only last for about 2-3 hours and then pain returns. Objective: See treatment diary below      Assessment: Tolerated treatment well. Patient exhibited good technique with therapeutic exercises and would benefit from continued PT   Performed ulnar nerve glides today, initially Pt had fair mobility; able to reach close to full elbow flexion wrist extension ulnar digit extension and about 45 ER and 70 degrees abduction post gliding; added ulnar nerve glides with HEP, pt able to perform, advised her to perform slowly and rhythmically, pt understood  attempted wrist maze and tennis ball rotation in palm but Pt had poor dexterity of her digits, pt had better success with wrist motion      Plan: Continue per plan of care.       Precautions: DOI 23  HEP: PROM digits, TGE's, digit abd/add, thumb opp, thumb rad/palm abd, towel bunches      Manuals             MH in flexion 10 min            PROM digits 10            Ulnar nerve glides 5             25            Neuro Re-Ed             TGE's             Pegs prehension 3 min            marbles 1/2 container            Grooved pegs             Nuts/bolts             frisbee 20cw/ccw             12            Ther Ex             Towel bunches 2 min            Cones/pegs grasping 3 min            Ball roll for wrist 3 min                                                                 8            Ther Activity                                       Gait Training                                       Modalities

## 2023-08-28 ENCOUNTER — OFFICE VISIT (OUTPATIENT)
Dept: PHYSICAL THERAPY | Facility: MEDICAL CENTER | Age: 63
End: 2023-08-28
Payer: COMMERCIAL

## 2023-08-28 DIAGNOSIS — R29.898 RIGHT HAND WEAKNESS: Primary | ICD-10-CM

## 2023-08-28 PROCEDURE — 97110 THERAPEUTIC EXERCISES: CPT | Performed by: PHYSICAL THERAPIST

## 2023-08-28 PROCEDURE — 97140 MANUAL THERAPY 1/> REGIONS: CPT | Performed by: PHYSICAL THERAPIST

## 2023-08-28 PROCEDURE — 97112 NEUROMUSCULAR REEDUCATION: CPT | Performed by: PHYSICAL THERAPIST

## 2023-08-28 NOTE — PROGRESS NOTES
Daily Note     Today's date: 2023  Patient name: Rani Pak  : 1960  MRN: [id number]  Referring provider: Rico Shone, DO  Dx:   Encounter Diagnosis     ICD-10-CM    1. Right hand weakness  R29.898                      Subjective: Pt reports pain is a little better. Some improvement in sleeping. Objective: See treatment diary below      Assessment: Tolerated treatment well. Patient exhibited good technique with therapeutic exercises and would benefit from continued PT  Improved PROM, less pain improved tolerance to manuals  Able to passively touch each digit to palm. Pt still limited in her AROM, yet she surprised herself with her ability to grasp pegs full with exception of IF ( still sensory loss in IF)  Pt reported feeling good post session, happy with progress      Plan: Continue per plan of care.       Precautions: DOI 23  HEP: PROM digits, TGE's, digit abd/add, thumb opp, thumb rad/palm abd, towel bunches      Manuals            MH in flexion 10 min 10 min           PROM digits 10 10           Ulnar nerve glides 5 5            25 25           Neuro Re-Ed             TGE's             Pegs prehension 3 min 3 min           marbles 1/2 container 3/4 container           Grooved pegs             Nuts/bolts             frisbee 20cw/ccw 20 cw/ccw            12 15           Ther Ex             Towel bunches 2 min 3 min           Cones/pegs grasping 3 min Pegs 3 min           Ball roll for wrist 3 min 3 min                                                                8 10           Ther Activity                                       Gait Training                                       Modalities

## 2023-08-30 ENCOUNTER — OFFICE VISIT (OUTPATIENT)
Dept: PHYSICAL THERAPY | Facility: MEDICAL CENTER | Age: 63
End: 2023-08-30
Payer: COMMERCIAL

## 2023-08-30 DIAGNOSIS — R29.898 RIGHT HAND WEAKNESS: Primary | ICD-10-CM

## 2023-08-30 PROCEDURE — 97110 THERAPEUTIC EXERCISES: CPT | Performed by: PHYSICAL THERAPIST

## 2023-08-30 PROCEDURE — 97140 MANUAL THERAPY 1/> REGIONS: CPT | Performed by: PHYSICAL THERAPIST

## 2023-08-30 PROCEDURE — 97112 NEUROMUSCULAR REEDUCATION: CPT | Performed by: PHYSICAL THERAPIST

## 2023-08-30 NOTE — PROGRESS NOTES
Daily Note     Today's date: 2023  Patient name: Maurice Asher  : 1960  MRN: [id number]  Referring provider: Ashlee Diaz DO  Dx:   Encounter Diagnosis     ICD-10-CM    1. Right hand weakness  R29.898                      Subjective: Pt reports she is feeling off today. She reports poor sleep last night. Thumb and IF still has poor sensation so its difficult moving independently and picking things up. Objective: See treatment diary below      Assessment: Tolerated treatment well. Patient exhibited good technique with therapeutic exercises and would benefit from continued PT   PROM gradually improving, AROM was a little less today observed with pegs grasping ex  Pt had increased neural tension especially in radial bias, able to improve with neural gliding post ex's  Added wrist curls, Pt tolerated well, some pulling pain with wrist flexion into volar FA. Plan: Continue per plan of care.       Precautions: DOI 23  HEP: PROM digits, TGE's, digit abd/add, thumb opp, thumb rad/palm abd, towel bunches      Manuals           MH in flexion 10 min 10 min 10          PROM digits 10 10 10          Ulnar nerve glides 5 5 10 all biases           25 25 30          Neuro Re-Ed             TGE's             Pegs prehension 3 min 3 min 3 min          marbles 1/2 container 3/4 container 3/4 container          Grooved pegs             Nuts/bolts             frisbee 20cw/ccw 20 cw/ccw 20 cw/ccw           12 15 10          Ther Ex             Towel bunches 2 min 3 min 3 min          Cones/pegs grasping 3 min Pegs 3 min 3 min          Ball roll for wrist 3 min 3 min 3 min          Wrist AROM   0# 2x10 each                                                  8 10 10          Ther Activity                                       Gait Training                                       Modalities

## 2023-09-05 ENCOUNTER — OFFICE VISIT (OUTPATIENT)
Dept: PHYSICAL THERAPY | Facility: MEDICAL CENTER | Age: 63
End: 2023-09-05
Payer: COMMERCIAL

## 2023-09-05 DIAGNOSIS — R29.898 RIGHT HAND WEAKNESS: Primary | ICD-10-CM

## 2023-09-05 PROCEDURE — 97112 NEUROMUSCULAR REEDUCATION: CPT

## 2023-09-05 PROCEDURE — 97110 THERAPEUTIC EXERCISES: CPT

## 2023-09-05 PROCEDURE — 97140 MANUAL THERAPY 1/> REGIONS: CPT

## 2023-09-05 NOTE — PROGRESS NOTES
Daily Note     Today's date: 2023  Patient name: Jaylin Camilo  : 1960  MRN: [id number]  Referring provider: Donna Grullon DO  Dx:   Encounter Diagnosis     ICD-10-CM    1. Right hand weakness  R29.898                      Subjective: Pt reports that she continues to struggle with picking things up with her R hand feeling as if her pinch  is still weak. She still experiences pain in her R wrist region on her ulnar side with swelling present 4-5/10. When asked, pain is only present at times throughout the day. Objective: See treatment diary below      Assessment: Tolerated treatment well. Patient exhibited good technique with therapeutic exercises and would benefit from continued PT   PROM gradually improving post manuals but still demonstrated limitations. Swelling also present along the base of her thumb. Plan: Continue per plan of care.       Precautions: DOI 23  HEP: PROM digits, TGE's, digit abd/add, thumb opp, thumb rad/palm abd, towel bunches      Manuals  9/5         MH in flexion 10 min 10 min 10 10         PROM digits 10 10 10 10         Ulnar nerve glides 5 5 10 all biases 5          25 25 30 25         Neuro Re-Ed             TGE's             Pegs prehension 3 min 3 min 3 min 3 min         marbles 1/2 container 3/4 container 3/4 container 3/4 container         Grooved pegs             Nuts/bolts             frisbee 20cw/ccw 20 cw/ccw 20 cw/ccw 20 cw/ ccw          12 15 10 10         Ther Ex             Towel bunches 2 min 3 min 3 min 3 min         Cones/pegs grasping 3 min Pegs 3 min 3 min 3 min         Ball roll for wrist 3 min 3 min 3 min 3 min         Wrist AROM   0# 2x10 each 0# 2x10                                                 8 10 10 10         Ther Activity                                       Gait Training                                       Modalities

## 2023-09-07 ENCOUNTER — APPOINTMENT (OUTPATIENT)
Dept: PHYSICAL THERAPY | Facility: MEDICAL CENTER | Age: 63
End: 2023-09-07
Payer: COMMERCIAL

## 2023-09-11 ENCOUNTER — OFFICE VISIT (OUTPATIENT)
Dept: PHYSICAL THERAPY | Facility: MEDICAL CENTER | Age: 63
End: 2023-09-11
Payer: COMMERCIAL

## 2023-09-11 ENCOUNTER — OFFICE VISIT (OUTPATIENT)
Dept: FAMILY MEDICINE CLINIC | Facility: CLINIC | Age: 63
End: 2023-09-11
Payer: COMMERCIAL

## 2023-09-11 VITALS
BODY MASS INDEX: 25.34 KG/M2 | HEART RATE: 76 BPM | HEIGHT: 63 IN | SYSTOLIC BLOOD PRESSURE: 120 MMHG | WEIGHT: 143 LBS | DIASTOLIC BLOOD PRESSURE: 60 MMHG | OXYGEN SATURATION: 100 %

## 2023-09-11 DIAGNOSIS — R29.898 WEAKNESS OF RIGHT HAND: ICD-10-CM

## 2023-09-11 DIAGNOSIS — R29.898 RIGHT HAND WEAKNESS: Primary | ICD-10-CM

## 2023-09-11 DIAGNOSIS — B02.29 POSTHERPETIC NEURALGIA: Primary | ICD-10-CM

## 2023-09-11 PROCEDURE — 97112 NEUROMUSCULAR REEDUCATION: CPT | Performed by: PHYSICAL THERAPIST

## 2023-09-11 PROCEDURE — 97140 MANUAL THERAPY 1/> REGIONS: CPT | Performed by: PHYSICAL THERAPIST

## 2023-09-11 PROCEDURE — 97110 THERAPEUTIC EXERCISES: CPT | Performed by: PHYSICAL THERAPIST

## 2023-09-11 PROCEDURE — 99213 OFFICE O/P EST LOW 20 MIN: CPT | Performed by: FAMILY MEDICINE

## 2023-09-11 RX ORDER — GABAPENTIN 300 MG/1
CAPSULE ORAL
Qty: 90 CAPSULE | Refills: 1 | Status: SHIPPED | OUTPATIENT
Start: 2023-09-11

## 2023-09-11 NOTE — PROGRESS NOTES
Daily Note     Today's date: 2023  Patient name: Alvarez Vasquez  : 1960  MRN: [id number]  Referring provider: Amos Wagner DO  Dx:   Encounter Diagnosis     ICD-10-CM    1. Right hand weakness  R29.898                      Subjective: This morning and last Friday had shooting nerve pain. Feeling a little bit more especially in IF and thumb,       Objective: See treatment diary below      Assessment: Tolerated treatment well. Patient exhibited good technique with therapeutic exercises and would benefit from continued PT  Comp flexion about 75% with place hold active lag in IF  Added wrist maze with ace wrap helping keep comp flexion around handle, pt felt more successful with exercise   Pt reported feeling better post session    Plan: Continue per plan of care.       Precautions: DOI 23  HEP: PROM digits, TGE's, digit abd/add, thumb opp, thumb rad/palm abd, towel bunches      Manuals         MH in flexion 10 min 10 min 10 10 10        PROM digits 10 10 10 10 10        Ulnar nerve glides 5 5 10 all biases 5 5         25 25 30 25 25        Neuro Re-Ed             Wrist maze     3 min- with a ce wrap        Pegs prehension 3 min 3 min 3 min 3 min 3 min        marbles 1/2 container 3/4 container 3/4 container 3/4 container 3/4 container        Grooved pegs             Nuts/bolts             frisbee 20cw/ccw 20 cw/ccw 20 cw/ccw 20 cw/ ccw 20 cw/ccw         12 15 10 10 12        Ther Ex             Towel bunches 2 min 3 min 3 min 3 min 3 min        Cones/pegs grasping 3 min Pegs 3 min 3 min 3 min 3 min        Ball roll for wrist 3 min 3 min 3 min 3 min 3 min        Wrist AROM   0# 2x10 each 0# 2x10 0# 2x10                                                8 10 10 10 15        Ther Activity                                       Gait Training                                       Modalities

## 2023-09-11 NOTE — PATIENT INSTRUCTIONS
She is making progress regarding postherpetic neuralgia/weakness right hand. Pain is much improved shoulder region/upper extremity, still has paresthesias ulnar distribution right hand/wrist.  Still with significant decreased  strength right hand, decreased fine motor but is improving. She will continue with PT twice weekly, started that August 21. She will continue with home exercise program.  Continue to use glove. She will decrease gabapentin to 3 times daily, 1 in the AM, 1 in the afternoon, 1 at bedtime. After 2 weeks she can drop to 1 in the morning, 1 at bedtime. We will reevaluate again in 3 weeks. She did restart CellCept today, she will see dermatology next month, sees rheumatology in January.

## 2023-09-11 NOTE — PROGRESS NOTES
FAMILY PRACTICE OFFICE VISIT  Sayda Kevin D.O. [address] [facility]  [address] [place] [address].  [place], [place], [facility]      NAME: Trae Bush  AGE: 70 y.o. SEX: female  : 1960   MRN: [id number]    DATE: 2023  TIME: 3:52 PM    Assessment and Plan     Problem List Items Addressed This Visit     Postherpetic neuralgia R Upper Extremity - Primary    Relevant Medications    gabapentin (NEURONTIN) 300 mg capsule    Weakness of right hand assoc w shingles       Patient Instructions   She is making progress regarding postherpetic neuralgia/weakness right hand. Pain is much improved shoulder region/upper extremity, still has paresthesias ulnar distribution right hand/wrist.  Still with significant decreased  strength right hand, decreased fine motor but is improving. She will continue with PT twice weekly, started that . She will continue with home exercise program.  Continue to use glove. She will decrease gabapentin to 3 times daily, 1 in the AM, 1 in the afternoon, 1 at bedtime. After 2 weeks she can drop to 1 in the morning, 1 at bedtime. We will reevaluate again in 3 weeks. She did restart CellCept today, she will see dermatology next month, sees rheumatology in January. Chief Complaint     Chief Complaint   Patient presents with   • Herpes Zoster     Follow Up       History of Present Illness   Ahmet Bush is a 70y.o.-year-old female who is in today for a recheck visit, see prior visits related to severe zoster right upper extremity with weakness right hand. She did start PT , has been doing that twice weekly, has been doing home exercise program routinely. Is using a glove. Does note pain upper arm/shoulder region has essentially resolved, still with paresthesias hand and wrist especially ulnar distribution. Still difficult to , fine motor improving but still has deficit.     Using gabapentin 4 times daily, some long-term issues with sleep but pain is not keeping her up. She did restart CellCept today      Review of Systems   Review of Systems   Constitutional:        See HPI, no fevers or chills, no chest pain or increased shortness of breath, no change in bowel or bladder. Using other medication as directed       Active Problem List     Patient Active Problem List   Diagnosis   • Adnexal mass   • Allergic rhinitis   • Anxiety   • Contrast media adverse reaction   • Hypercholesterolemia   • Post traumatic stress disorder   • Microscopic hematuria   • ILANA (stress urinary incontinence, female)   • Cutaneous lupus erythematosus   • Moderate episode of recurrent major depressive disorder (HCC)   • Postherpetic neuralgia R Upper Extremity   • Weakness of right hand assoc w shingles       Past Medical History:  Reviewed    Past Surgical History:  Reviewed    Family History:  Reviewed    Social History:  Reviewed    Objective     Vitals:    09/11/23 1451   BP: 120/60   BP Location: Left arm   Patient Position: Sitting   Cuff Size: Large   Pulse: 76   SpO2: 100%   Weight: 64.9 kg (143 lb)   Height: 5' 3" (1.6 m)     Body mass index is 25.33 kg/m². BP Readings from Last 3 Encounters:   09/11/23 120/60   08/14/23 124/64   08/01/23 120/70       Wt Readings from Last 3 Encounters:   09/11/23 64.9 kg (143 lb)   08/14/23 64.9 kg (143 lb)   08/01/23 65.3 kg (143 lb 14.4 oz)       Physical Exam  Constitutional:       Comments: Pleasant 67-year-old female, appears much more comfortable today. Initially had glove right hand, that was removed for exam.    Has scarring right upper extremity consistent with healing zoster. Has decreased /decreased fine motor right hand. Pulses intact. Continues with decreased sensation to fine touch ulnar distribution hand.   Color, warmth is okay    Proximal muscle strength essentially the same as left upper extremity         ALLERGIES:  Allergies   Allergen Reactions   • Gadolinium Anaphylaxis   • Codeine Syncope   • Oxycodone GI Intolerance     Was seen at ER 8/16/23   • Plaquenil [Hydroxychloroquine] GI Intolerance   • Trazodone Nausea Only   • Amoxicillin Rash   • Iodinated Contrast Media Hives and Itching     Annotation - [id number]: throat itching   • Metrizamide Hives and Itching       Current Medications     Current Outpatient Medications   Medication Sig Dispense Refill   • Calcium Carb-Cholecalciferol (CALCIUM 600 + D PO) Take 1 tablet by mouth daily     • Cholecalciferol (VITAMIN D3) 50 MCG (2000 UT) capsule Take 1 capsule (2,000 Units total) by mouth daily     • escitalopram (LEXAPRO) 5 mg tablet Take 1 tablet (5 mg total) by mouth every morning 90 tablet 1   • gabapentin (NEURONTIN) 300 mg capsule Use 1 pill 3 times daily for 2 weeks then drop to twice daily ( for shingles pain in arm ) 90 capsule 1   • ketoconazole (NIZORAL) 2 % cream APPLY TOPICALLY DAILY FOR 14 DAYS     • Multiple Vitamins-Minerals (ONE-A-DAY 50 PLUS PO) Take 1 tablet by mouth daily     • mycophenolate (CELLCEPT) 500 mg tablet Take 2 tablets (1,000 mg total) by mouth 2 (two) times a day 1 tablet 0   • Omega-3 Fatty Acids (OMEGA 3 PO) Take 500 mg by mouth daily Once daily     • rosuvastatin (CRESTOR) 5 mg tablet Take 1 tablet (5 mg total) by mouth 3 (three) times a week 45 tablet 3     No current facility-administered medications for this visit. No orders of the defined types were placed in this encounter.         Juan Ramon Wagner,

## 2023-09-11 NOTE — LETTER
September 11, 2023     Patient: Moustapha Chinchilla  YOB: 1960  Date of Visit: 9/11/2023      To Whom it May Concern:    Moustapha Chinchilla is a long-term patient in our family practice. She was seen for a reevaluation today, she is making progress regarding weakness right hand related to a severe case of zoster but still has significant weakness right hand. She has been out of work since July 24, she must remain out of work at present, we will reevaluate in 3 weeks. If you have any questions, please don't hesitate to call.          Sincerely,          Luis Jackson DO        CC: No Recipients

## 2023-09-13 ENCOUNTER — OFFICE VISIT (OUTPATIENT)
Dept: PHYSICAL THERAPY | Facility: MEDICAL CENTER | Age: 63
End: 2023-09-13
Payer: COMMERCIAL

## 2023-09-13 ENCOUNTER — APPOINTMENT (OUTPATIENT)
Dept: PHYSICAL THERAPY | Facility: MEDICAL CENTER | Age: 63
End: 2023-09-13
Payer: COMMERCIAL

## 2023-09-13 DIAGNOSIS — R29.898 RIGHT HAND WEAKNESS: Primary | ICD-10-CM

## 2023-09-13 PROCEDURE — 97140 MANUAL THERAPY 1/> REGIONS: CPT | Performed by: PHYSICAL THERAPIST

## 2023-09-13 PROCEDURE — 97112 NEUROMUSCULAR REEDUCATION: CPT | Performed by: PHYSICAL THERAPIST

## 2023-09-13 PROCEDURE — 97110 THERAPEUTIC EXERCISES: CPT | Performed by: PHYSICAL THERAPIST

## 2023-09-13 NOTE — PROGRESS NOTES
Daily Note     Today's date: 2023  Patient name: Tena Barrios  : 1960  MRN: [id number]  Referring provider: Geovanni Hill DO  Dx:   Encounter Diagnosis     ICD-10-CM    1. Right hand weakness  R29.898                      Subjective: Pt reports she was having increased pain yesterday all along ulnar nerve distribution. Going to see Dr Luis Alberto Ross later this week. Objective: See treatment diary below      Assessment: Tolerated treatment well. Patient exhibited good technique with therapeutic exercises and would benefit from continued PT  Pt still has mild lag in her IF when moving down into comp flexion; passively only mild extrinsic tension and soft tissue approximation with edema  Pt was able to grasp handle of wrist maze today without issues  Added some light resistance to ex's today, pt reported feeling good post session      Plan: Continue per plan of care.       Precautions: DOI 23  HEP: PROM digits, TGE's, digit abd/add, thumb opp, thumb rad/palm abd, towel bunches      Manuals        MH in flexion 10 min 10 min 10 10 10 10 wrapafter 5       PROM digits 10 10 10 10 10 10       Ulnar nerve glides 5 5 10 all biases 5 5 5        25 25 30 25 25 25       Neuro Re-Ed             Wrist maze     3 min- with a ce wrap 3 min       Pegs prehension 3 min 3 min 3 min 3 min 3 min 3 min       marbles 1/2 container 3/4 container 3/4 container 3/4 container 3/4 container whole container       Grooved pegs             Nuts/bolts             frisbee 20cw/ccw 20 cw/ccw 20 cw/ccw 20 cw/ ccw 20 cw/ccw         12 15 10 10 12 10       Ther Ex             Towel bunches 2 min 3 min 3 min 3 min 3 min 3 min       Cones/pegs grasping 3 min Pegs 3 min 3 min 3 min 3 min 3 min       Ball roll for wrist 3 min 3 min 3 min 3 min 3 min 3 min       Wrist AROM   0# 2x10 each 0# 2x10 0# 2x10 YTB 2x10       fingerweb      yellow 20x       flexbar sup/pron      yellow 10x each                     8 10 10 10 15 20       Ther Activity                                       Gait Training                                       Modalities

## 2023-09-14 ENCOUNTER — VBI (OUTPATIENT)
Dept: FAMILY MEDICINE CLINIC | Facility: CLINIC | Age: 63
End: 2023-09-14

## 2023-09-14 ENCOUNTER — APPOINTMENT (OUTPATIENT)
Dept: PHYSICAL THERAPY | Facility: MEDICAL CENTER | Age: 63
End: 2023-09-14
Payer: COMMERCIAL

## 2023-09-14 NOTE — TELEPHONE ENCOUNTER
09/14/23 10:15 AM    Patient contacted post ED visit, [facility] department spoke with patient/caregiver and outreach was successful. Thank you.   Dee Ibarra  [facility]

## 2023-09-18 ENCOUNTER — OFFICE VISIT (OUTPATIENT)
Dept: PHYSICAL THERAPY | Facility: MEDICAL CENTER | Age: 63
End: 2023-09-18
Payer: COMMERCIAL

## 2023-09-18 DIAGNOSIS — R29.898 RIGHT HAND WEAKNESS: Primary | ICD-10-CM

## 2023-09-18 PROCEDURE — 97110 THERAPEUTIC EXERCISES: CPT | Performed by: PHYSICAL THERAPIST

## 2023-09-18 PROCEDURE — 97140 MANUAL THERAPY 1/> REGIONS: CPT | Performed by: PHYSICAL THERAPIST

## 2023-09-18 PROCEDURE — 97112 NEUROMUSCULAR REEDUCATION: CPT | Performed by: PHYSICAL THERAPIST

## 2023-09-18 NOTE — PROGRESS NOTES
Daily Note     Today's date: 2023  Patient name: Rm Flower  : 1960  MRN: [id number]  Referring provider: Jorden Cabrera DO  Dx:   Encounter Diagnosis     ICD-10-CM    1. Right hand weakness  R29.898                      Subjective: Pt reports she still has tingling along her SF and ulnar hand. Increasing feeling and better manipulation in IF and thumb. IF still having trouble with bending down compared to other digits. Objective: See treatment diary below      Assessment: Tolerated treatment well. Patient exhibited good technique with therapeutic exercises and would benefit from continued PT  IF still has some mild tension to extrinsic extensors, MF has some intrinsic tightness  Performed some blocking to IF DIP joint, fair activation of FDP but weak  Advised Pt to continue working on her active motion and strength in IF, Pt understood  Added nuts/botls to program to better her manipulation, pt able to tolerate  Pt had less fatigue today with strengthening ex's compared to prior session    Plan: Continue per plan of care.       Precautions: DOI 23  HEP: PROM digits, TGE's, digit abd/add, thumb opp, thumb rad/palm abd, towel bunches      Manuals  9/      MH in flexion 10 min 10 min 10 10 10 10 wrapafter 5 10 wrap total time      PROM digits 10 10 10 10 10 10 100      Ulnar nerve glides 5 5 10 all biases 5 5 5 5       25 25 30 25 25 25 25      Neuro Re-Ed             Wrist maze     3 min- with a ce wrap 3 min 3 min      Pegs prehension 3 min 3 min 3 min 3 min 3 min 3 min 3 min      marbles 1/2 container 3/4 container 3/4 container 3/4 container 3/4 container whole container NP      Grooved pegs             Nuts/bolts       4 min      frisbee 20cw/ccw 20 cw/ccw 20 cw/ccw 20 cw/ ccw 20 cw/ccw         12 15 10 10 12 10 10      Ther Ex             Towel bunches 2 min 3 min 3 min 3 min 3 min 3 min NP      Cones/pegs grasping 3 min Pegs 3 min 3 min 3 min 3 min 3 min 3 min Ball roll for wrist 3 min 3 min 3 min 3 min 3 min 3 min NP      Wrist AROM   0# 2x10 each 0# 2x10 0# 2x10 YTB 2x10 YTB 2x10      fingerweb      yellow 20x yellow 30x      flexbar sup/pron      yellow 10x each yellow 20x                    8 10 10 10 15 20 15      Ther Activity                                       Gait Training                                       Modalities

## 2023-09-20 ENCOUNTER — OFFICE VISIT (OUTPATIENT)
Dept: PHYSICAL THERAPY | Facility: MEDICAL CENTER | Age: 63
End: 2023-09-20
Payer: COMMERCIAL

## 2023-09-20 DIAGNOSIS — R29.898 RIGHT HAND WEAKNESS: Primary | ICD-10-CM

## 2023-09-20 PROCEDURE — 97140 MANUAL THERAPY 1/> REGIONS: CPT | Performed by: PHYSICAL THERAPIST

## 2023-09-20 PROCEDURE — 97110 THERAPEUTIC EXERCISES: CPT | Performed by: PHYSICAL THERAPIST

## 2023-09-20 PROCEDURE — 97112 NEUROMUSCULAR REEDUCATION: CPT | Performed by: PHYSICAL THERAPIST

## 2023-09-20 NOTE — PROGRESS NOTES
Daily Note     Today's date: 2023  Patient name: Harvey Breen  : 1960  MRN: [id number]  Referring provider: Edvin Paul DO  Dx:   Encounter Diagnosis     ICD-10-CM    1. Right hand weakness  R29.898                      Subjective: Pt reports she had pain later in the night after last appointment. Was getting shooting pain in tendons along ulnar wrist.     Objective: See treatment diary below      Assessment: Tolerated treatment well. Patient exhibited good technique with therapeutic exercises and would benefit from continued PT  Pt arrived to therapy with her composite fist forming more like hook fist, pt noted increased tension  No change in passive motion in digits. Slight improvement in FDP activation in IF  Improvement in sensation  West monofilament 0.07 IF-RF, SF 0.2g,(improvement from 2g at IE)  Pt noted increased soreness today compared to prior session     Plan: Continue per plan of care.       Precautions: DOI 23  HEP: PROM digits, TGE's, digit abd/add, thumb opp, thumb rad/palm abd, towel bunches       Manuals  9/     MH in flexion 10 min 10 min 10 10 10 10 wrapafter 5 10 wrap total time 10 wrap     PROM digits 10 10 10 10 10 10 10 10     Ulnar nerve glides 5 5 10 all biases 5 5 5 5       25 25 30 25 25 25 25 20     Neuro Re-Ed             Wrist maze     3 min- with a ce wrap 3 min 3 min 3 min     Pegs prehension 3 min 3 min 3 min 3 min 3 min 3 min 3 min 3 min     marbles 1/2 container 3/4 container 3/4 container 3/4 container 3/4 container whole container NP      Grooved pegs             Nuts/bolts       4 min 4 min     frisbee 20cw/ccw 20 cw/ccw 20 cw/ccw 20 cw/ ccw 20 cw/ccw         12 15 10 10 12 10 10 10     Ther Ex             Towel bunches 2 min 3 min 3 min 3 min 3 min 3 min NP 3 min     Cones/pegs grasping 3 min Pegs 3 min 3 min 3 min 3 min 3 min 3 min 3 min     Ball roll for wrist 3 min 3 min 3 min 3 min 3 min 3 min NP      Wrist AROM   0# 2x10 each 0# 2x10 0# 2x10 YTB 2x10 YTB 2x10 1# 2x10     fingerweb      yellow 20x yellow 30x yellow 30x     flexbar sup/pron      yellow 10x each yellow 20x yellow 20x                   8 10 10 10 15 20 15 15     Ther Activity                                       Gait Training                                       Modalities

## 2023-09-25 ENCOUNTER — OFFICE VISIT (OUTPATIENT)
Dept: PHYSICAL THERAPY | Facility: MEDICAL CENTER | Age: 63
End: 2023-09-25
Payer: COMMERCIAL

## 2023-09-25 DIAGNOSIS — R29.898 RIGHT HAND WEAKNESS: Primary | ICD-10-CM

## 2023-09-25 PROCEDURE — 97140 MANUAL THERAPY 1/> REGIONS: CPT | Performed by: PHYSICAL THERAPIST

## 2023-09-25 PROCEDURE — 97110 THERAPEUTIC EXERCISES: CPT | Performed by: PHYSICAL THERAPIST

## 2023-09-25 PROCEDURE — 97112 NEUROMUSCULAR REEDUCATION: CPT | Performed by: PHYSICAL THERAPIST

## 2023-09-25 NOTE — PROGRESS NOTES
Daily Note     Today's date: 2023  Patient name: Carmine Chauhan  : 1960  MRN: [id number]  Referring provider: Jena Pisano DO  Dx:   Encounter Diagnosis     ICD-10-CM    1. Right hand weakness  R29.898                      Subjective: Pt reports she still is having neural pain and symptoms along ulnar forearm own to her ulnar digits, no worse      Objective: See treatment diary below      Assessment: Tolerated treatment well. Patient exhibited good technique with therapeutic exercises and would benefit from continued PT  Pt was able to touch her palm with her IF with place holds, still minimal flexion at DIP joint but improved power  No complaints post session      Plan: Continue per plan of care.       Precautions: DOI 23  HEP: PROM digits, TGE's, digit abd/add, thumb opp, thumb rad/palm abd, towel bunches       Manuals     MH in flexion 10 min 10 min 10 10 10 10 wrapafter 5 10 wrap total time 10 wrap 10 wrap    PROM digits 10 10 10 10 10 10 10 10 10    Ulnar nerve glides 5 5 10 all biases 5 5 5 5       25 25 30 25 25 25 25 20 20    Neuro Re-Ed             Wrist maze     3 min- with a ce wrap 3 min 3 min 3 min 3 min    Pegs prehension 3 min 3 min 3 min 3 min 3 min 3 min 3 min 3 min 3 min    marbles 1/2 container 3/4 container 3/4 container 3/4 container 3/4 container whole container NP      Grooved pegs             Nuts/bolts       4 min 4 min 4 min    frisbee 20cw/ccw 20 cw/ccw 20 cw/ccw 20 cw/ ccw 20 cw/ccw         12 15 10 10 12 10 10 10 10    Ther Ex             Towel bunches 2 min 3 min 3 min 3 min 3 min 3 min NP 3 min 3 min    Cones/pegs grasping 3 min Pegs 3 min 3 min 3 min 3 min 3 min 3 min 3 min 3 min    Ball roll for wrist 3 min 3 min 3 min 3 min 3 min 3 min NP      Wrist AROM   0# 2x10 each 0# 2x10 0# 2x10 YTB 2x10 YTB 2x10 1# 2x10 1# 2x10    fingerweb      yellow 20x yellow 30x yellow 30x  yellow 30x    flexbar sup/pron      yellow 10x each yellow 20x yellow 20x yellow 20x                  8 10 10 10 15 20 15 15 15    Ther Activity                                       Gait Training                                       Modalities

## 2023-09-27 ENCOUNTER — OFFICE VISIT (OUTPATIENT)
Dept: PHYSICAL THERAPY | Facility: MEDICAL CENTER | Age: 63
End: 2023-09-27
Payer: COMMERCIAL

## 2023-09-27 DIAGNOSIS — R29.898 RIGHT HAND WEAKNESS: Primary | ICD-10-CM

## 2023-09-27 PROCEDURE — 97140 MANUAL THERAPY 1/> REGIONS: CPT | Performed by: PHYSICAL THERAPIST

## 2023-09-27 PROCEDURE — 97112 NEUROMUSCULAR REEDUCATION: CPT | Performed by: PHYSICAL THERAPIST

## 2023-09-27 PROCEDURE — 97110 THERAPEUTIC EXERCISES: CPT | Performed by: PHYSICAL THERAPIST

## 2023-09-27 NOTE — PROGRESS NOTES
Daily Note     Today's date: 2023  Patient name: Yuli Aguilera  : 1960  MRN: [id number]  Referring provider: Daryn Barajas DO  Dx:   Encounter Diagnosis     ICD-10-CM    1. Right hand weakness  R29.898                      Subjective: Pt reports she had increased soreness post session last visit      Objective: See treatment diary below      Assessment: Tolerated treatment well. Patient exhibited good technique with therapeutic exercises and would benefit from continued PT  Pt had about 80% comp fist with place hold post manuals  Pt noted she felt better  reintroduced grooved pegs to program, pt had some difficulty with manipulation but was able to  pegs using IF and thumb and complete board  No complaints post session     Plan: Continue per plan of care.       Precautions: DOI 23  HEP: PROM digits, TGE's, digit abd/add, thumb opp, thumb rad/palm abd, towel bunches       Manuals    MH in flexion 10 min 10 min 10 10 10 10 wrapafter 5 10 wrap total time 10 wrap 10 wrap 10   PROM digits 10 10 10 10 10 10 10 10 10 10   Ulnar nerve glides 5 5 10 all biases 5 5 5 5       25 25 30 25 25 25 25 20 20 20   Neuro Re-Ed             Wrist maze     3 min- with a ce wrap 3 min 3 min 3 min 3 min 3 min   Pegs prehension 3 min 3 min 3 min 3 min 3 min 3 min 3 min 3 min 3 min chess pieces 3 min   marbles 1/2 container 3/4 container 3/4 container 3/4 container 3/4 container whole container NP      Grooved pegs          1 board   Nuts/bolts       4 min 4 min 4 min 4 min   frisbee 20cw/ccw 20 cw/ccw 20 cw/ccw 20 cw/ ccw 20 cw/ccw         12 15 10 10 12 10 10 10 10 15   Ther Ex             Towel bunches 2 min 3 min 3 min 3 min 3 min 3 min NP  3 min NP   Cones/pegs grasping 3 min Pegs 3 min 3 min 3 min 3 min 3 min 3 min 3 min 3 min 3 min   Ball roll for wrist 3 min 3 min 3 min 3 min 3 min 3 min NP 3 min  3 min   Wrist AROM   0# 2x10 each 0# 2x10 0# 2x10 YTB 2x10 YTB 2x10 1# 2x10 1# 2x10 1# 2x10   fingerweb      yellow 20x yellow 30x yellow 30x  yellow 30x  yellow 30x   flexbar sup/pron      yellow 10x each yellow 20x yellow 20x yellow 20x yellow 20                 8 10 10 10 15 20 15 15 15 15   Ther Activity                                       Gait Training                                       Modalities

## 2023-10-02 ENCOUNTER — OFFICE VISIT (OUTPATIENT)
Dept: FAMILY MEDICINE CLINIC | Facility: CLINIC | Age: 63
End: 2023-10-02
Payer: COMMERCIAL

## 2023-10-02 ENCOUNTER — OFFICE VISIT (OUTPATIENT)
Dept: PHYSICAL THERAPY | Facility: MEDICAL CENTER | Age: 63
End: 2023-10-02
Payer: COMMERCIAL

## 2023-10-02 VITALS
BODY MASS INDEX: 25.87 KG/M2 | HEIGHT: 63 IN | HEART RATE: 82 BPM | RESPIRATION RATE: 12 BRPM | SYSTOLIC BLOOD PRESSURE: 140 MMHG | OXYGEN SATURATION: 100 % | DIASTOLIC BLOOD PRESSURE: 80 MMHG | WEIGHT: 146 LBS

## 2023-10-02 DIAGNOSIS — R29.898 WEAKNESS OF RIGHT HAND: ICD-10-CM

## 2023-10-02 DIAGNOSIS — M20.091 ULNAR DEVIATION OF FINGER OF RIGHT HAND: ICD-10-CM

## 2023-10-02 DIAGNOSIS — B02.29 POSTHERPETIC NEURALGIA: Primary | ICD-10-CM

## 2023-10-02 DIAGNOSIS — R29.898 RIGHT HAND WEAKNESS: Primary | ICD-10-CM

## 2023-10-02 PROCEDURE — 97112 NEUROMUSCULAR REEDUCATION: CPT | Performed by: PHYSICAL THERAPIST

## 2023-10-02 PROCEDURE — 99213 OFFICE O/P EST LOW 20 MIN: CPT | Performed by: FAMILY MEDICINE

## 2023-10-02 PROCEDURE — 97110 THERAPEUTIC EXERCISES: CPT | Performed by: PHYSICAL THERAPIST

## 2023-10-02 PROCEDURE — 97140 MANUAL THERAPY 1/> REGIONS: CPT | Performed by: PHYSICAL THERAPIST

## 2023-10-02 RX ORDER — GABAPENTIN 300 MG/1
300 CAPSULE ORAL
Qty: 1 CAPSULE | Refills: 0
Start: 2023-10-02

## 2023-10-02 NOTE — PROGRESS NOTES
Daily Note/Re-evaluation     Today's date: 10/2/2023  Patient name: Genesis Taylor  : 1960  MRN: [id number]  Referring provider: Alan Asher DO  Dx:   Encounter Diagnosis     ICD-10-CM    1. Right hand weakness  R29.898                      Subjective: Pt reports she had increased edema and soreness on Saturday after cleaning. Pt reports she can do more with her R hand but has trouble turning doorknobs, and holding things with applied pressure. SF and ulnar hand still tingling, noticed, ulnar forearm is feeling less sensitive      Objective: See treatment diary below      Assessment: Tolerated treatment well. Patient exhibited good technique with therapeutic exercises and would benefit from continued PT  AROM wrist ext/flex 50/60 PROM wrist ext 60  Full PROM  Active comp flexion  IF MF  RF SF  65 80 75 75  80 85 95 95  30 55 45 45   IF PIP ext 20; full passive  RF 10 degrees; SF 15 degrees   mild clawing but correctable with MP blocking   II R/L 5/60 lbs  Lp 2/18 lbs  3pt 4/17 lbs  Wrist ext/flex  West monofilament 0.07 IF-RF, SF 0.2g,(improvement from 2g at IE)  Goals  STG (2-3 weeks)  1: Pt independent in HEP- met  2: Improve AROM 10-15 degrees- met  3: improve thumb abd/ext 10 degrees- met  4: full PROM- met  5: decrease edema 25%- met    LTG (2-3 weeks)  1: Improve FOTO 10-15 pts- not met  2: full active comp fist- not met  3: Pt able to manipulate small objects without difficulty or fatigue- partially met  4: improve sensation to 0.2 g or better- met  5:  strength within 25 lbs of uninvolved side- not met  6: pinch strength within 5 lbs of uninvolved side- not met      Plan: Continue per plan of care. Recommend another 6 weeks to better her strength and functioning.       Precautions: DOI 23  HEP: PROM digits, TGE's, digit abd/add, thumb opp, thumb rad/palm abd, towel bunches       Manuals 10/2            MH in flexion 10            PROM digits 10            Ulnar nerve glides 5 + assessment 30            Neuro Re-Ed             Wrist maze             Chess pieces 3 min            clothespins 4 min yellow            Grooved pegs 1 board            Nuts/bolts 3 min                          15            Ther Ex                          pegs grasping 3 min            Ball roll for wrist 3 min            Wrist AROM 2# 2x10            fingerweb Red 20x            flexbar sup/pron Yellow 20x            flexbar towel twist Yellow 20x             20            Ther Activity                                       Gait Training                                       Modalities

## 2023-10-02 NOTE — PROGRESS NOTES
FAMILY PRACTICE OFFICE VISIT  Nani Kevin D.O. [address] [facility]  [address] [place] [address].  [place], [place], [address]      NAME: Trae Bush  AGE: 70 y.o. 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 Has noted improvement with doing physical therapy, continue twice weekly, also continue home exercise program routinely to strengthen right hand/improve fine motor range of motion. She will continue to use glove supplied by therapist.    She will decrease gabapentin further to 300 mg only at night. See prior visits, has been out of work since , needs to be able to use arms/hands repetitively, needs full dexterity and strength both hands-she is unable to perform duties. Must remain out of work at present.     CBC, CMP, TSH along with urinalysis were okay in August.  She will see her dermatologist this month, sees rheumatology again in January, continues on CellCept    Await consult, recheck again in 4 weeks      Chief Complaint     Chief Complaint   Patient presents with   • Follow-up     1 month follow up History of Present Illness   Mylene Olivas is a 70y.o.-year-old female who I had last seen September 11, she is in to reevaluate postherpetic neuralgia right upper extremity with weakness right hand. Continues to do physical therapy twice weekly, is doing home exercises daily at home, is using glove supplied by therapist.  Les Ward with weakness right hand, decreased , decreased fine motor. Remains out of work as before since July 24 -needs full dexterity and strength both hands in order to fulfill job duties, job duties are included with August 29 paperwork under media. 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     BP Readings from Last 3 Encounters:   10/02/23 140/80   09/11/23 120/60   08/14/23 124/64       Wt Readings from Last 3 Encounters:   10/02/23 66.2 kg (146 lb)   09/11/23 64.9 kg (143 lb)   08/14/23 64.9 kg (143 lb)       Physical Exam  Constitutional:       Comments: Pleasant 25-year-old, looks well other than ongoing issues right hand Decreased  right vs left which remains significant - see PT notes -    Has ulnar drift right 5th finger. Still some decreased fine motor right hand. Good color and warmth -  Pulses ok. Good prox UE strength  Healing zoster lesions/ scarring inner right arm, forearm          ALLERGIES:  Allergies   Allergen Reactions   • Gadolinium Anaphylaxis   • Codeine Syncope   • Oxycodone GI Intolerance     Was seen at ER 8/16/23   • Plaquenil [Hydroxychloroquine] GI Intolerance   • Trazodone Nausea Only   • Amoxicillin Rash   • Iodinated Contrast Media Hives and Itching     [facility] - [id number]: throat itching   • Metrizamide Hives and Itching       Current Medications     Current Outpatient Medications   Medication Sig Dispense Refill   • Calcium Carb-Cholecalciferol (CALCIUM 600 + D PO) Take 1 tablet by mouth daily     • Cholecalciferol (VITAMIN D3) 50 MCG (2000 UT) capsule Take 1 capsule (2,000 Units total) by mouth daily     • escitalopram (LEXAPRO) 5 mg tablet Take 1 tablet (5 mg total) by mouth every morning 90 tablet 1   • gabapentin (NEURONTIN) 300 mg capsule Take 1 capsule (300 mg total) by mouth daily at bedtime ( for shingles pain in arm ) 1 capsule 0   • ketoconazole (NIZORAL) 2 % cream APPLY TOPICALLY DAILY FOR 14 DAYS     • Multiple Vitamins-Minerals (ONE-A-DAY 50 PLUS PO) Take 1 tablet by mouth daily     • mycophenolate (CELLCEPT) 500 mg tablet Take 2 tablets (1,000 mg total) by mouth 2 (two) times a day 1 tablet 0   • Omega-3 Fatty Acids (OMEGA 3 PO) Take 500 mg by mouth daily Once daily     • rosuvastatin (CRESTOR) 5 mg tablet Take 1 tablet (5 mg total) by mouth 3 (three) times a week 45 tablet 3     No current facility-administered medications for this visit.             Orders Placed This Encounter   Procedures   • Ambulatory Referral to Hand Surgery         Jose Howell DO

## 2023-10-02 NOTE — LETTER
October 2, 2023     Patient: Harvey Breen  YOB: 1960  Date of Visit: 10/2/2023      To Whom it May Concern:    Harvey Breen was seen in the office today for a reevaluation, she continues with postherpetic complications/weakness with decreased , decreased fine motor skills right hand related to prior severe case of shingles. She has been out of work since July 24 and must remain out of work for now, we will reevaluate again in 4 weeks, she will also be seeing a hand specialist and will continue her therapy. If you have any questions or concerns, please don't hesitate to call.          Sincerely,          Edvin Paul,         CC: No Recipients

## 2023-10-02 NOTE — PATIENT INSTRUCTIONS
Continues to heal after severe case 'shingles' right upper extremity, postherpetic pain much improved but continues with significant weakness right  along with ulnar drift right fifth finger, is improving with physical therapy, is using gloves but I would like to get an opinion with hand specialist to see if there is anything else we can add in for her. Has noted improvement with doing physical therapy, continue twice weekly, also continue home exercise program routinely to strengthen right hand/improve fine motor range of motion. She will continue to use glove supplied by therapist.    She will decrease gabapentin further to 300 mg only at night. See prior visits, has been out of work since July 24, needs to be able to use arms/hands repetitively, needs full dexterity and strength both hands-she is unable to perform duties. Must remain out of work at present.     CBC, CMP, TSH along with urinalysis were okay in August.  She will see her dermatologist this month, sees rheumatology again in January, continues on CellCept    Await consult, recheck again in 4 weeks

## 2023-10-04 ENCOUNTER — OFFICE VISIT (OUTPATIENT)
Dept: PHYSICAL THERAPY | Facility: MEDICAL CENTER | Age: 63
End: 2023-10-04
Payer: COMMERCIAL

## 2023-10-04 DIAGNOSIS — R29.898 RIGHT HAND WEAKNESS: Primary | ICD-10-CM

## 2023-10-04 PROCEDURE — 97140 MANUAL THERAPY 1/> REGIONS: CPT | Performed by: PHYSICAL THERAPIST

## 2023-10-04 PROCEDURE — 97110 THERAPEUTIC EXERCISES: CPT | Performed by: PHYSICAL THERAPIST

## 2023-10-04 PROCEDURE — 97112 NEUROMUSCULAR REEDUCATION: CPT | Performed by: PHYSICAL THERAPIST

## 2023-10-04 NOTE — PROGRESS NOTES
Daily Note     Today's date: 10/4/2023  Patient name: Mylene Olivas  : 1960  MRN: [id number]  Referring provider: Claire Porter DO  Dx:   Encounter Diagnosis     ICD-10-CM    1. Right hand weakness  R29.898                      Subjective: Pt reports she feels more tension and soreness in her tendons      Objective: See treatment diary below      Assessment: Tolerated treatment well. Patient exhibited good technique with therapeutic exercises and would benefit from continued PT   Pt's ROM is continuing to improve Pt able to perform about 20 degrees of DIP flexion in comp flexion  Pt has + wartenberg's sign with SF abduction- provided her with foam block to practice digit add at home. No complaints post session    Plan: Continue per plan of care.       Precautions: DOI 23  HEP: PROM digits, TGE's, digit abd/add, thumb opp, thumb rad/palm abd, towel bunches       Manuals 10/2 10/4           MH in flexion 10 10           PROM digits 10 10 + STM           Ulnar nerve glides 5 + assessment             30 20           Neuro Re-Ed             Wrist maze             Chess pieces 3 min 3 min           clothespins 4 min yellow 4 min yellow           Grooved pegs 1 board 1 board           Nuts/bolts 3 min                          15 10           Ther Ex                          pegs grasping 3 min 3 min           Ball roll for wrist 3 min 3 min           Wrist AROM 2# 2x10 2# 2x10           fingerweb Red 20x  red 20x           flexbar sup/pron Yellow 20x Yellow 20x           flexbar towel twist Yellow 20x Yellow 20x            20 20           Ther Activity                                       Gait Training                                       Modalities

## 2023-10-09 ENCOUNTER — APPOINTMENT (OUTPATIENT)
Dept: PHYSICAL THERAPY | Facility: MEDICAL CENTER | Age: 63
End: 2023-10-09
Payer: COMMERCIAL

## 2023-10-11 ENCOUNTER — OFFICE VISIT (OUTPATIENT)
Dept: OBGYN CLINIC | Facility: MEDICAL CENTER | Age: 63
End: 2023-10-11
Payer: COMMERCIAL

## 2023-10-11 ENCOUNTER — OFFICE VISIT (OUTPATIENT)
Dept: PHYSICAL THERAPY | Facility: MEDICAL CENTER | Age: 63
End: 2023-10-11
Payer: COMMERCIAL

## 2023-10-11 VITALS
HEART RATE: 76 BPM | DIASTOLIC BLOOD PRESSURE: 74 MMHG | HEIGHT: 63 IN | SYSTOLIC BLOOD PRESSURE: 138 MMHG | WEIGHT: 147.2 LBS | BODY MASS INDEX: 26.08 KG/M2

## 2023-10-11 DIAGNOSIS — M20.091 ULNAR DEVIATION OF FINGER OF RIGHT HAND: ICD-10-CM

## 2023-10-11 DIAGNOSIS — B02.29 POSTHERPETIC NEURALGIA: ICD-10-CM

## 2023-10-11 DIAGNOSIS — R29.898 WEAKNESS OF RIGHT HAND: ICD-10-CM

## 2023-10-11 DIAGNOSIS — R29.898 RIGHT HAND WEAKNESS: Primary | ICD-10-CM

## 2023-10-11 PROCEDURE — 97110 THERAPEUTIC EXERCISES: CPT | Performed by: PHYSICAL THERAPIST

## 2023-10-11 PROCEDURE — 97140 MANUAL THERAPY 1/> REGIONS: CPT | Performed by: PHYSICAL THERAPIST

## 2023-10-11 PROCEDURE — 97112 NEUROMUSCULAR REEDUCATION: CPT | Performed by: PHYSICAL THERAPIST

## 2023-10-11 PROCEDURE — 99203 OFFICE O/P NEW LOW 30 MIN: CPT | Performed by: ORTHOPAEDIC SURGERY

## 2023-10-11 RX ORDER — METHYLPREDNISOLONE 4 MG/1
TABLET ORAL
Qty: 1 EACH | Refills: 0 | Status: SHIPPED | OUTPATIENT
Start: 2023-10-11

## 2023-10-11 NOTE — PROGRESS NOTES
Daily Note     Today's date: 10/11/2023  Patient name: Cindi Donahue  : 1960  MRN: [id number]  Referring provider: Roselia Flores DO  Dx:   Encounter Diagnosis     ICD-10-CM    1. Right hand weakness  R29.898                      Subjective: Missed appointment earlier in the week due to illness. Pt reports her hand feels more swollen along her palm and less in her dorsal hand      Objective: See treatment diary below      Assessment: Tolerated treatment well. Patient exhibited good technique with therapeutic exercises and would benefit from continued PT  Pt's active comp fist same as before- able to touch palm with least tuck from IF  Digits other than IF have full PROM, mild stiffness in IF  Kept program stable. Pt had improved time when completing grooved pegs exercise and was able to do some of the red clothespins at the [employer]  No complaints post session    Plan: Continue per plan of care.       Precautions: DOI 23  HEP: PROM digits, TGE's, digit abd/add, thumb opp, thumb rad/palm abd, towel bunches       Manuals 10/2 10/ 10/10          MH in flexion 10 10 10          PROM digits 10 10 + STM 10          Ulnar nerve glides 5 + assessment             30 20 20          Neuro Re-Ed             Wrist maze             Chess pieces 3 min 3 min 3 min          clothespins 4 min yellow 4 min yellow 4 min red and yellow          Grooved pegs 1 board 1 board  1 board          Nuts/bolts 3 min                          15 10 10          Ther Ex                          pegs grasping 3 min 3 min 3 min          Ball roll for wrist 3 min 3 min 3 min          Wrist AROM 2# 2x10 2# 2x10 2# 2x10          fingerweb Red 20x  red 20x Red 20x          flexbar sup/pron Yellow 20x Yellow 20x Yellow 20x          flexbar towel twist Yellow 20x Yellow 20x  Yellow 20x           20 20 20          Ther Activity                                       Gait Training                                       Modalities

## 2023-10-11 NOTE — PROGRESS NOTES
The HAND & UPPER EXTREMITY OFFICE VISIT   Referred By:  Amos Wagner, Do  [address]  [address]  [place]ospitals,  [place] [address]      Chief Complaint:     Right hand weakness    History of Present Illness:   70 y.o., right hand dominant female presents with right hand weakness and pain after developing a case of shingles in late July early August.  She was hospitalized with her shingles for 1 day due to severe pain. She she was referred by her primary care provider who's note was reviewed in detail today. Ever since her case of shingles she developed a case of severe hand pain and arm pain which lasted for several weeks but has been improving. As her pain has been improving she has noticed weakness in her right hand as well as tingling, stiffness and swelling. She states that these symptoms are slowly improving after starting therapy but she is still significantly weak compared to the contralateral side. She not had any issues with the hand or numbness or tingling prior to this case of shingles. ADLs: Community ambulator      Past Medical History:  Past Medical History:   Diagnosis Date    Abnormal LFTs     resolved    Carpal tunnel syndrome on right     Depression     last assessed 11/11/13    Herpes zoster right arm 7/28/2023    PTSD (post-traumatic stress disorder)     Vasovagal syncope     last assessed 06/23/14     Past Surgical History:   Procedure Laterality Date    CHOLECYSTECTOMY      COLONOSCOPY      TRIGGER FINGER RELEASE Right      Family History   Problem Relation Age of Onset    No Known Problems Daughter     Breast cancer Neg Hx      Social History     Socioeconomic History    Marital status: Single     Spouse name: Not on file    Number of children: Not on file    Years of education: Not on file    Highest education level: Not on file   Occupational History    Not on file   Tobacco Use    Smoking status: Never    Smokeless tobacco: Never   Substance and Sexual Activity    Alcohol use:  No Drug use: No    Sexual activity: Not on file   Other Topics Concern    Not on file   Social History Narrative         Social Determinants of Health     Financial Resource Strain: Not on file   Food Insecurity: Not on file   Transportation Needs: Not on file   Physical Activity: Not on file   Stress: Not on file   Social Connections: Not on file   Intimate Partner Violence: Not on file   Housing Stability: Not on file     Scheduled Meds:  Continuous Infusions:No current facility-administered medications for this visit. PRN Meds:. Allergies   Allergen Reactions    Gadolinium Anaphylaxis    Codeine Syncope    Oxycodone GI Intolerance     Was seen at ER 8/16/23    Plaquenil [Hydroxychloroquine] GI Intolerance    Trazodone Nausea Only    Amoxicillin Rash    Iodinated Contrast Media Hives and Itching     Annotation - [id number]: throat itching    Metrizamide Hives and Itching           Physical Examination:    /74   Pulse 76   Ht 5' 3" (1.6 m)   Wt 66.8 kg (147 lb 3.2 oz)   BMI 26.08 kg/m²     Gen: A&Ox3, NAD  Cardiac: regular rate  Chest: non labored breathing  Abdomen: Non-distended    Cervcial Spine: Negative Spurling's    Right Upper Extremity:  Healing scars along the right upper extremity from her prior shingles. Swelling in the hand  Decreased range of motion of the fingers. Unable to make a composite fist with fingertips approximate 1 cm from palm  Paresthesias in the median nerve distribution. Decree sensation in the ulnar nerve distribution  Positive Tinel's at the median nerve at the wrist and ulnar nerve at the elbow  Sensation intact to light touch in the axillary and radial nerve distributions  3/5 motor interosseous, 4/5 motor thumb opposition and thumb extension  2+RP      Left Upper Extremity:  Skin CDI  No obvious deformity of the shoulder, arm, elbow, forearm, wrist, hand  Nontender  Full finger and wrist range of motion.   Sensation intact to light touch in the axillary median, ulnar, and radial nerve distributions  5/5 motor interosseous, thumb opposition, thumb extension  2+RP      Studies:  No imaging to review. Assessment and Plan:  1. Postherpetic neuralgia R Upper Extremity  Ambulatory Referral to Hand Surgery    methylPREDNISolone 4 MG tablet therapy pack      2. Weakness of right hand assoc w shingles  Ambulatory Referral to Hand Surgery    methylPREDNISolone 4 MG tablet therapy pack      3. 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 female presents with signs and symptoms consistent with the above diagnosis. We discussed the natural history of this condition and its pathogenesis. We discussed operative and nonoperative treatment options. She has weakness in the right hand and also irritation of her median and ulnar nerves on testing today. With postherpetic neuralgia the symptoms do usually improve with time but may take several months for recovery of her nerve symptoms and weakness. She should continue to work with therapy on finger hand and wrist range of motion and strengthening. She should continue to wear her edema glove to control the swelling in her hand. We well also trial a course of corticosteroids with a Medrol Dosepak which was prescribed today. This will hopefully help with the swelling and stiffness in her hand and allow her to more easily work with therapy and make gains on her range of motion. I will see her in 2 months for repeat evaluation. If she still appears to have persistent irritation of her median and/or ulnar nerve specifically we may investigate further with a nerve conduction/EMG test.  However I am optimistic that with time her symptoms will improve. she expressed understanding of the plan and agreed. We encouraged them to contact our office with any questions or concerns.          Jeff Terrell MD  Hand and Upper Extremity Surgery

## 2023-10-16 ENCOUNTER — OFFICE VISIT (OUTPATIENT)
Dept: PHYSICAL THERAPY | Facility: MEDICAL CENTER | Age: 63
End: 2023-10-16
Payer: COMMERCIAL

## 2023-10-16 DIAGNOSIS — R29.898 RIGHT HAND WEAKNESS: Primary | ICD-10-CM

## 2023-10-16 PROCEDURE — 97110 THERAPEUTIC EXERCISES: CPT | Performed by: PHYSICAL THERAPIST

## 2023-10-16 PROCEDURE — 97140 MANUAL THERAPY 1/> REGIONS: CPT | Performed by: PHYSICAL THERAPIST

## 2023-10-16 NOTE — PROGRESS NOTES
Daily Note     Today's date: 10/16/2023  Patient name: William Connell  : 1960  MRN: [id number]  Referring provider: Avel Pleitez DO  Dx:   Encounter Diagnosis     ICD-10-CM    1. Right hand weakness  R29.898                      Subjective: Patient states her mobility is much better. She states strength is her primary limitation at this time. Objective: See treatment diary below      Assessment: Patient presents with improving composite fist. She has mild flexion lag of IF but improved post manual interventions. Challenged with pinch strength with clothespins. Demonstrates claw deformity of digits 4-5. Patient encouraged to continue using right hand with ADLs/IADLs for functional strengthening. Plan: Continue per plan of care.       Precautions: DOI 23  HEP: PROM digits, TGE's, digit abd/add, thumb opp, thumb rad/palm abd, towel bunches       Manuals 10/2 10/4 10/10 10/16         MH in flexion 10 10 10 10         PROM digits 10 10 + STM 10 10         Ulnar nerve glides 5 + assessment             30 20 20          Neuro Re-Ed             Wrist maze             Chess pieces 3 min 3 min 3 min 3 min          clothespins 4 min yellow 4 min yellow 4 min red and yellow 4 min red         Grooved pegs 1 board 1 board  1 board 1 board         Nuts/bolts 3 min                          15 10 10          Ther Ex                          pegs grasping 3 min 3 min 3 min np         Ball roll for wrist 3 min 3 min 3 min 3 min         Wrist AROM 2# 2x10 2# 2x10 2# 2x10 2# 2x10         fingerweb Red 20x  red 20x Red 20x Red x30         flexbar sup/pron Yellow 20x Yellow 20x Yellow 20x Ylw x30         flexbar towel twist Yellow 20x Yellow 20x  Yellow 20x Ylw x30          20 20 20          Ther Activity                                       Gait Training                                       Modalities

## 2023-10-19 ENCOUNTER — OFFICE VISIT (OUTPATIENT)
Dept: PHYSICAL THERAPY | Facility: MEDICAL CENTER | Age: 63
End: 2023-10-19
Payer: COMMERCIAL

## 2023-10-19 DIAGNOSIS — R29.898 RIGHT HAND WEAKNESS: Primary | ICD-10-CM

## 2023-10-19 PROCEDURE — 97112 NEUROMUSCULAR REEDUCATION: CPT | Performed by: PHYSICAL THERAPIST

## 2023-10-19 PROCEDURE — 97110 THERAPEUTIC EXERCISES: CPT | Performed by: PHYSICAL THERAPIST

## 2023-10-19 PROCEDURE — 97140 MANUAL THERAPY 1/> REGIONS: CPT | Performed by: PHYSICAL THERAPIST

## 2023-10-19 NOTE — PROGRESS NOTES
Daily Note     Today's date: 10/19/2023  Patient name: Mariah Krabbe  : 1960  MRN: [id number]  Referring provider: Dylan Munoz DO  Dx:   Encounter Diagnosis     ICD-10-CM    1. Right hand weakness  R29.898                      Subjective: Pt reports hand is coming along fingers are moving better but still some weakness       Objective: See treatment diary below      Assessment: Tolerated treatment well. Patient exhibited good technique with therapeutic exercises and would benefit from continued PT  Any ROM restrictions are limited due to ulnar nerve motor neuropathy ( I.e. lag in terminal extension and wartenberg's sign)  Pt able to tolerate increased resistance. Reviewed HEP to help with motor issues in intrinsics    Plan: Continue per plan of care.       Precautions: DOI 23  HEP: PROM digits, TGE's, digit abd/add, thumb opp, thumb rad/palm abd, towel bunches, putty roll/pinch, foam squeezes      Manuals 10/2 10/4 10/10 10/16 10/19        MH in flexion 10 10 10 10 10        PROM digits 10 10 + STM 10 10 10        Ulnar nerve glides 5 + assessment             30 20 20  20        Neuro Re-Ed             Wrist maze             Chess pieces 3 min 3 min 3 min 3 min  3 min        clothespins 4 min yellow 4 min yellow 4 min red and yellow 4 min red 4 min red        Grooved pegs 1 board 1 board  1 board 1 board 1 board        Nuts/bolts 3 min                          15 10 10  10        Ther Ex                          pegs grasping 3 min 3 min 3 min np         Ball roll for wrist 3 min 3 min 3 min 3 min         Wrist AROM 2# 2x10 2# 2x10 2# 2x10 2# 2x10 2# 2x10        fingerweb Red 20x  red 20x Red 20x Red x30 Red 30x        flexbar sup/pron Yellow 20x Yellow 20x Yellow 20x Ylw x30 Red 20x        flexbar towel twist Yellow 20x Yellow 20x  Yellow 20x Ylw x30 Red 20x         20 20 20  15        Ther Activity                                       Gait Training                                       Modalities

## 2023-10-23 ENCOUNTER — OFFICE VISIT (OUTPATIENT)
Dept: PHYSICAL THERAPY | Facility: MEDICAL CENTER | Age: 63
End: 2023-10-23
Payer: COMMERCIAL

## 2023-10-23 DIAGNOSIS — R29.898 RIGHT HAND WEAKNESS: Primary | ICD-10-CM

## 2023-10-23 PROCEDURE — 97112 NEUROMUSCULAR REEDUCATION: CPT | Performed by: PHYSICAL THERAPIST

## 2023-10-23 PROCEDURE — 97140 MANUAL THERAPY 1/> REGIONS: CPT | Performed by: PHYSICAL THERAPIST

## 2023-10-23 PROCEDURE — 97110 THERAPEUTIC EXERCISES: CPT | Performed by: PHYSICAL THERAPIST

## 2023-10-23 NOTE — PROGRESS NOTES
Daily Note     Today's date: 10/23/2023  Patient name: Fernando Shields  : 1960  MRN: [id number]  Referring provider: Megan Oquendo DO  Dx:   Encounter Diagnosis     ICD-10-CM    1. Right hand weakness  R29.898                      Subjective: Pt reports she had a rough weekend. Trying to use her hand more      Objective: See treatment diary below      Assessment: Tolerated treatment well. Patient exhibited good technique with therapeutic exercises and would benefit from continued PT  Pt had full functional composite fist; still active lag in intrinsics with IP extension and full digit adduction; able to fully extend with MCP stabilization  Fabricated intrinsic plus splint , pt happy with feel to help promote more intrinsic activity    Plan: Continue per plan of care.       Precautions: DOI 23  HEP: PROM digits, TGE's, digit abd/add, thumb opp, thumb rad/palm abd, towel bunches, putty roll/pinch, foam squeezes      Manuals 10/2 10/4 10/10 10/16 10/19 10/23       MH in flexion 10 10 10 10 10 10       PROM digits 10 10 + STM 10 10 10 10       Ulnar nerve glides 5 + assessment             30 20 20  20 20       Neuro Re-Ed             Wrist maze             Chess pieces 3 min 3 min 3 min 3 min  3 min 3 min       clothespins 4 min yellow 4 min yellow 4 min red and yellow 4 min red 4 min red 4 min red       Grooved pegs 1 board 1 board  1 board 1 board 1 board 1 board       Nuts/bolts 3 min                          15 10 10  10 10       Ther Ex                          pegs grasping 3 min 3 min 3 min np         Ball roll for wrist 3 min 3 min 3 min 3 min NP        Wrist AROM 2# 2x10 2# 2x10 2# 2x10 2# 2x10 2# 2x10 2# 2x10       fingerweb Red 20x  red 20x Red 20x Red x30 Red 30x Red 30x       flexbar sup/pron Yellow 20x Yellow 20x Yellow 20x Ylw x30 Red 20x Red 30x       flexbar towel twist Yellow 20x Yellow 20x  Yellow 20x Ylw x30 Red 20x Red 30x        20 20 20  15 15       Ther Activity Gait Training                                       Modalities

## 2023-10-25 ENCOUNTER — OFFICE VISIT (OUTPATIENT)
Dept: PHYSICAL THERAPY | Facility: MEDICAL CENTER | Age: 63
End: 2023-10-25
Payer: COMMERCIAL

## 2023-10-25 DIAGNOSIS — R29.898 RIGHT HAND WEAKNESS: Primary | ICD-10-CM

## 2023-10-25 PROCEDURE — 97110 THERAPEUTIC EXERCISES: CPT | Performed by: PHYSICAL THERAPIST

## 2023-10-25 PROCEDURE — 97112 NEUROMUSCULAR REEDUCATION: CPT | Performed by: PHYSICAL THERAPIST

## 2023-10-25 PROCEDURE — 97140 MANUAL THERAPY 1/> REGIONS: CPT | Performed by: PHYSICAL THERAPIST

## 2023-10-25 NOTE — PROGRESS NOTES
Daily Note     Today's date: 10/25/2023  Patient name: Harvey Breen  : 1960  MRN: [id number]  Referring provider: Edvin Paul DO  Dx:   Encounter Diagnosis     ICD-10-CM    1. Right hand weakness  R29.898                      Subjective: Pt reports intrinsic plus splint has been helping. Still constant tingling to ulnar digits      Objective: See treatment diary below      Assessment: Tolerated treatment well. Patient exhibited good technique with therapeutic exercises and would benefit from continued PT  Pt had improved extension to digits, still moving into MCP hyperextension but PIP's able tor extend to at least 30 degrees  Increased resistance to program, pt tolerated well  Pt still has active lag in all intrinsics in R hand. Plan: Continue per plan of care.       Precautions: DOI 23  HEP: PROM digits, TGE's, digit abd/add, thumb opp, thumb rad/palm abd, towel bunches, putty roll/pinch, foam squeezes      Manuals 10/2 10/4 10/10 10/16 10/19 10/23 10/25      MH in flexion 10 10 10 10 10 10 10      PROM digits 10 10 + STM 10 10 10 10 10      Ulnar nerve glides 5 + assessment             30 20 20  20 20 20      Neuro Re-Ed             Wrist maze             Chess pieces 3 min 3 min 3 min 3 min  3 min 3 min 3 min      clothespins 4 min yellow 4 min yellow 4 min red and yellow 4 min red 4 min red 4 min red 4 min red      Grooved pegs 1 board 1 board  1 board 1 board 1 board 1 board 1 board      Nuts/bolts 3 min                          15 10 10  10 10 10      Ther Ex                          pegs grasping 3 min 3 min 3 min np         Ball roll for wrist 3 min 3 min 3 min 3 min NP        Wrist AROM 2# 2x10 2# 2x10 2# 2x10 2# 2x10 2# 2x10 2# 2x10 3# 2x10      fingerweb Red 20x  red 20x Red 20x Red x30 Red 30x Red 30x Green 20x      flexbar sup/pron Yellow 20x Yellow 20x Yellow 20x Ylw x30 Red 20x Red 30x Red 30x      flexbar towel twist Yellow 20x Yellow 20x  Yellow 20x Ylw x30 Red 20x Red 30x Red 30x digiweb       Light red 30x       20 20 20  15 15 15      Ther Activity                                       Gait Training                                       Modalities

## 2023-10-31 ENCOUNTER — OFFICE VISIT (OUTPATIENT)
Dept: PHYSICAL THERAPY | Facility: MEDICAL CENTER | Age: 63
End: 2023-10-31
Payer: COMMERCIAL

## 2023-10-31 ENCOUNTER — OFFICE VISIT (OUTPATIENT)
Dept: FAMILY MEDICINE CLINIC | Facility: CLINIC | Age: 63
End: 2023-10-31
Payer: COMMERCIAL

## 2023-10-31 ENCOUNTER — TELEPHONE (OUTPATIENT)
Dept: FAMILY MEDICINE CLINIC | Facility: CLINIC | Age: 63
End: 2023-10-31

## 2023-10-31 VITALS
BODY MASS INDEX: 25.86 KG/M2 | OXYGEN SATURATION: 99 % | SYSTOLIC BLOOD PRESSURE: 139 MMHG | DIASTOLIC BLOOD PRESSURE: 67 MMHG | HEART RATE: 75 BPM | WEIGHT: 146 LBS

## 2023-10-31 DIAGNOSIS — R29.898 WEAKNESS OF RIGHT HAND: ICD-10-CM

## 2023-10-31 DIAGNOSIS — R29.898 RIGHT HAND WEAKNESS: Primary | ICD-10-CM

## 2023-10-31 DIAGNOSIS — B02.29 POSTHERPETIC NEURALGIA: Primary | ICD-10-CM

## 2023-10-31 DIAGNOSIS — M20.091 ULNAR DEVIATION OF FINGER OF RIGHT HAND: ICD-10-CM

## 2023-10-31 PROCEDURE — 97112 NEUROMUSCULAR REEDUCATION: CPT | Performed by: PHYSICAL THERAPIST

## 2023-10-31 PROCEDURE — 97140 MANUAL THERAPY 1/> REGIONS: CPT | Performed by: PHYSICAL THERAPIST

## 2023-10-31 PROCEDURE — 97110 THERAPEUTIC EXERCISES: CPT | Performed by: PHYSICAL THERAPIST

## 2023-10-31 PROCEDURE — 99213 OFFICE O/P EST LOW 20 MIN: CPT | Performed by: FAMILY MEDICINE

## 2023-10-31 NOTE — PATIENT INSTRUCTIONS
She is slowly improving, we did review orthopedic hand specialty consultation from October 11, she did use Medrol dose pack which helped to decrease swelling, range of motion is slowly improving, still has decreased , decreased fine motor, see physical therapy notes, last saw them today. She will continue to do physical therapy, continue to use glove, now also has a band to use regarding fifth digit drift. Continue to do home exercise program.    She has been using gabapentin 300 mg at bedtime, she feels she has been sleeping okay, neuralgia/tingling has improved, she can try to stop that, restart if needed. She did see dermatology in follow-up, was released to see them as needed, she will be seeing rheumatology again in January, continues on CellCept. [facility] does have slip to do DEXA scan through them. CBC, CMP, TSH was okay in August.    Regarding work she has been out since July 24 of this year, she does need to have full use of her hands to do repetition, with ongoing weakness, decreased fine motor she must remain out of work, we will reevaluate in December after she sees her hand specialis again -sees them December 11. We will continue to fill out paperwork regarding this.

## 2023-10-31 NOTE — PROGRESS NOTES
Daily Note     Today's date: 10/31/2023  Patient name: David Foote  : 1960  MRN: [id number]  Referring provider: Bijal Friedman DO  Dx:   Encounter Diagnosis     ICD-10-CM    1. Right hand weakness  R29.898                      Subjective: Pt report she had increased neural pain along ulnar hand on Sat night. May have done too much. Also intrinsic plus splint has been too tight lately. Objective: See treatment diary below      Assessment: Tolerated treatment well. Patient exhibited good technique with therapeutic exercises and would benefit from continued PT  Full comp flexion of digits  Active extension, less hyperextension of MCP joint, able to reach about 30 degrees PIP extension    Plan: Continue per plan of care.       Precautions: DOI 23  HEP: PROM digits, TGE's, digit abd/add, thumb opp, thumb rad/palm abd, towel bunches, putty roll/pinch, foam squeezes      Manuals 10/2 10/4 10/10 10/16 10/19 10/23 10/25 10/31     MH in flexion 10 10 10 10 10 10 10 10     PROM digits 10 10 + STM 10 10 10 10 10 10     Ulnar nerve glides 5 + assessment             30 20 20  20 20 20 20     Neuro Re-Ed             Wrist maze             Chess pieces 3 min 3 min 3 min 3 min  3 min 3 min 3 min 3 min     clothespins 4 min yellow 4 min yellow 4 min red and yellow 4 min red 4 min red 4 min red 4 min red 4 min red     Grooved pegs 1 board 1 board  1 board 1 board 1 board 1 board 1 board  1 board     Nuts/bolts 3 min                          15 10 10  10 10 10 10     Ther Ex                          pegs grasping 3 min 3 min 3 min np         Ball roll for wrist 3 min 3 min 3 min 3 min NP        Wrist AROM 2# 2x10 2# 2x10 2# 2x10 2# 2x10 2# 2x10 2# 2x10 3# 2x10 3# 2x10     fingerweb Red 20x  red 20x Red 20x Red x30 Red 30x Red 30x Green 20x  Green 20x     flexbar sup/pron Yellow 20x Yellow 20x Yellow 20x Ylw x30 Red 20x Red 30x Red 30x  Red 30x     flexbar towel twist Yellow 20x Yellow 20x  Yellow 20x Ylw x30 Red 20x Red 30x Red 30x Red 30x     digiweb       Light red 30x Light red 30x      20 20 20  15 15 15 15     Ther Activity                                       Gait Training                                       Modalities

## 2023-10-31 NOTE — PROGRESS NOTES
FAMILY PRACTICE OFFICE VISIT  Jasmyne Kevin D.O. [address] [facility]  [address] [place] [address].  [place], [place], [address]      NAME: Trae Bush  AGE: 70 y.o. 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 She will continue to do physical therapy, continue to use glove, now also has a band to use regarding fifth digit drift. Continue to do home exercise program.    She has been using gabapentin 300 mg at bedtime, she feels she has been sleeping okay, neuralgia/tingling has improved, she can try to stop that, restart if needed. She did see dermatology in follow-up, was released to see them as needed, she will be seeing rheumatology again in January, continues on CellCept. [facility] does have slip to do DEXA scan through them. CBC, CMP, TSH was okay in August.    Regarding work she has been out since  of this year, she does need to have full use of her hands to do repetition, with ongoing weakness, decreased fine motor she must remain out of work, we will reevaluate in December after she sees her hand specialis again -sees them . We will continue to fill out paperwork regarding this.     Chief Complaint     Chief Complaint   Patient presents with    Follow-up     4w       History of Present Illness   Fernando Bose is a 70y.o.-year-old female who is in to reevaluate weakness right hand/postherpetic neuralgia right upper extremity, see previous notes. She did see orthopedic hand specialist October 11, received prescription for Medrol Dosepak, she does feel oral steroid helps some with the swelling. She continues to do physical therapy, continues with decreased , weakness, decreased fine motor. She is using a glove, also has a band to use regarding ulnar drift. She relates she is trying to do her home exercise program.    She has been sleeping okay, relates has tingling but pain levels are down. Continues on CellCept, sees rheumatology as before, did see dermatology. Needs further paperwork filled out, we have kept her out of work since July 24      Review of Systems   Review of Systems   Constitutional:         No fevers or chills, no chest pain, shortness of breath, palpitations, abdominal pain, change in bowel or bladder. 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     BP Readings from Last 3 Encounters:   10/31/23 139/67   10/11/23 138/74   10/02/23 140/80       Wt Readings from Last 3 Encounters:   10/31/23 66.2 kg (146 lb)   10/11/23 66.8 kg (147 lb 3.2 oz) 10/02/23 66.2 kg (146 lb)       Physical Exam  Constitutional:       Comments: Pleasant 68-year-old female seated in chair, alert, oriented. She does continue with decreased  right hand, decreased fine motor right hand, ulnar drift. Swelling appears to have come down, range of motion does seem to slowly be improving. Coloration looks okay overall, does have scarring from previous zoster lesions right upper extremity. ALLERGIES:  Allergies   Allergen Reactions    Gadolinium Anaphylaxis    Codeine Syncope    Oxycodone GI Intolerance     Was seen at ER 8/16/23    Plaquenil [Hydroxychloroquine] GI Intolerance    Trazodone Nausea Only    Amoxicillin Rash    Iodinated Contrast Media Hives and Itching     Annotation - [id number]: throat itching    Metrizamide Hives and Itching       Current Medications     Current Outpatient Medications   Medication Sig Dispense Refill    Calcium Carb-Cholecalciferol (CALCIUM 600 + D PO) Take 1 tablet by mouth daily      Cholecalciferol (VITAMIN D3) 50 MCG (2000 UT) capsule Take 1 capsule (2,000 Units total) by mouth daily      escitalopram (LEXAPRO) 5 mg tablet Take 1 tablet (5 mg total) by mouth every morning 90 tablet 1    ketoconazole (NIZORAL) 2 % cream APPLY TOPICALLY DAILY FOR 14 DAYS (Patient not taking: Reported on 10/11/2023)      Multiple Vitamins-Minerals (ONE-A-DAY 50 PLUS PO) Take 1 tablet by mouth daily      mycophenolate (CELLCEPT) 500 mg tablet Take 2 tablets (1,000 mg total) by mouth 2 (two) times a day 1 tablet 0    Omega-3 Fatty Acids (OMEGA 3 PO) Take 500 mg by mouth daily Once daily      rosuvastatin (CRESTOR) 5 mg tablet Take 1 tablet (5 mg total) by mouth 3 (three) times a week 45 tablet 3     No current facility-administered medications for this visit. No orders of the defined types were placed in this encounter.         Santos Sood DO

## 2023-10-31 NOTE — LETTER
October 31, 2023     Patient: Fernando Shields  YOB: 1960  Date of Visit: 10/31/2023      To Whom it May Concern:    Fernando Shields was seen for a reevaluation in our family practice today, she is slowly improving but continues with weakness of right hand, decreased fine motor use right hand, she must remain out of work. She has been out of work since July 24. She will be seeing her hand specialist again December 11, she will continue with physical therapy along with home exercise program, we will reevaluate in mid December regarding possibility of returning to work at that time. If you have any questions or concerns, please don't hesitate to call.          Sincerely,          Megan Oquendo DO        CC: No Recipients

## 2023-10-31 NOTE — TELEPHONE ENCOUNTER
Faxed attached forms & visit note from 10/31/23 to [employer] [facility] [phone] and patient's [phone] per PT request.

## 2023-11-03 ENCOUNTER — OFFICE VISIT (OUTPATIENT)
Dept: PHYSICAL THERAPY | Facility: MEDICAL CENTER | Age: 63
End: 2023-11-03
Payer: COMMERCIAL

## 2023-11-03 DIAGNOSIS — R29.898 RIGHT HAND WEAKNESS: Primary | ICD-10-CM

## 2023-11-03 PROCEDURE — 97110 THERAPEUTIC EXERCISES: CPT | Performed by: PHYSICAL THERAPIST

## 2023-11-03 PROCEDURE — 97140 MANUAL THERAPY 1/> REGIONS: CPT | Performed by: PHYSICAL THERAPIST

## 2023-11-03 NOTE — PROGRESS NOTES
Daily Note     Today's date: 11/3/2023  Patient name: Alvarez Vasquez  : 1960  MRN: [id number]  Referring provider: Amos Wagner DO  Dx:   Encounter Diagnosis     ICD-10-CM    1. Right hand weakness  R29.898                      Subjective: Pt reports relative motion splint has been rubbing. Pt noted she was able to open a bottle cap the other day, she pushed hard to do it and had some pain along her volar ulnar wrist after but it calmed down with some ice after. Objective: See treatment diary below      Assessment: Tolerated treatment well. Patient exhibited good technique with therapeutic exercises and would benefit from continued PT  Refabricated custom relative motion splint kept open across dorsal hand with strap. Pt happy with fit, made with aquaplast vs orficast  Pt had improved PIP extension to lateral digits with less hyperextension to MCP joint  Still unable to actively adduct her SF still    Plan: Continue per plan of care.       Precautions: DOI 23  HEP: PROM digits, TGE's, digit abd/add, thumb opp, thumb rad/palm abd, towel bunches, putty roll/pinch, foam squeezes      Manuals 10/2 10/4 10/10 10/16 10/19 10/23 10/25 10/31 11/3    MH in flexion 10 10 10 10 10 10 10 10 10    PROM digits 10 10 + STM 10 10 10 10 10 10 10    Ulnar nerve glides 5 + assessment             30 20 20  20 20 20 20 20    Neuro Re-Ed             Wrist maze             Chess pieces 3 min 3 min 3 min 3 min  3 min 3 min 3 min 3 min     clothespins 4 min yellow 4 min yellow 4 min red and yellow 4 min red 4 min red 4 min red 4 min red 4 min red 4 min red    Grooved pegs 1 board 1 board  1 board 1 board 1 board 1 board 1 board  1 board 1 board    Nuts/bolts 3 min                          15 10 10  10 10 10 10     Ther Ex                          pegs grasping 3 min 3 min 3 min np         Ball roll for wrist 3 min 3 min 3 min 3 min NP        Wrist AROM 2# 2x10 2# 2x10 2# 2x10 2# 2x10 2# 2x10 2# 2x10 3# 2x10 3# 2x10 3# 3x10 fingerweb Red 20x  red 20x Red 20x Red x30 Red 30x Red 30x Green 20x  Green 20x  Green 30x    flexbar sup/pron Yellow 20x Yellow 20x Yellow 20x Ylw x30 Red 20x Red 30x Red 30x  Red 30x Red 30x    flexbar towel twist Yellow 20x Yellow 20x  Yellow 20x Ylw x30 Red 20x Red 30x Red 30x Red 30x Red 30x    digiweb       Light red 30x Light red 30x  Light red 30x     20 20 20  15 15 15 15 20    Ther Activity                                       Gait Training                                       Modalities

## 2023-11-07 ENCOUNTER — RA CDI HCC (OUTPATIENT)
Dept: OTHER | Facility: HOSPITAL | Age: 63
End: 2023-11-07

## 2023-11-08 ENCOUNTER — OFFICE VISIT (OUTPATIENT)
Dept: PHYSICAL THERAPY | Facility: MEDICAL CENTER | Age: 63
End: 2023-11-08
Payer: COMMERCIAL

## 2023-11-08 DIAGNOSIS — R29.898 RIGHT HAND WEAKNESS: Primary | ICD-10-CM

## 2023-11-08 PROCEDURE — 97112 NEUROMUSCULAR REEDUCATION: CPT | Performed by: PHYSICAL THERAPIST

## 2023-11-08 PROCEDURE — 97110 THERAPEUTIC EXERCISES: CPT | Performed by: PHYSICAL THERAPIST

## 2023-11-08 PROCEDURE — 97140 MANUAL THERAPY 1/> REGIONS: CPT | Performed by: PHYSICAL THERAPIST

## 2023-11-08 NOTE — PROGRESS NOTES
[address] coding opportunities       Chart reviewed, no opportunity found: CHART REVIEWED, NO OPPORTUNITY FOUND        Patients Insurance        Commercial Insurance: [employer]

## 2023-11-10 ENCOUNTER — OFFICE VISIT (OUTPATIENT)
Dept: PHYSICAL THERAPY | Facility: MEDICAL CENTER | Age: 63
End: 2023-11-10
Payer: COMMERCIAL

## 2023-11-10 DIAGNOSIS — R29.898 RIGHT HAND WEAKNESS: Primary | ICD-10-CM

## 2023-11-10 PROCEDURE — 97112 NEUROMUSCULAR REEDUCATION: CPT | Performed by: PHYSICAL THERAPIST

## 2023-11-10 PROCEDURE — 97110 THERAPEUTIC EXERCISES: CPT | Performed by: PHYSICAL THERAPIST

## 2023-11-10 PROCEDURE — 97140 MANUAL THERAPY 1/> REGIONS: CPT | Performed by: PHYSICAL THERAPIST

## 2023-11-10 NOTE — PROGRESS NOTES
Daily Note     Today's date: 11/10/2023  Patient name: Moustapha Chinchilla  : 1960  MRN: [id number]  Referring provider: Luis Jackson DO  Dx:   Encounter Diagnosis     ICD-10-CM    1. Right hand weakness  R29.898                      Subjective: Pt reports she did some cleaning yesterday. Has some swelling along ulnar hand and wrist today. Objective: See treatment diary below      Assessment: Tolerated treatment well. Patient exhibited good technique with therapeutic exercises and would benefit from continued PT  Pt has improved PIP extension,able to hold 15-20 degrees  Pt still has trouble with SF digit adduction and middle digit abduction  Added putty press to NE program and HEP, pt wiling to try. Pt reports she also has been using flexbars at home which has been helping. Plan: Continue per plan of care. Progress note during next visit.       Precautions: DOI 23  HEP: PROM digits, TGE's, digit abd/add, thumb opp, thumb rad/palm abd, towel bunches, putty roll/pinch, foam squeezes      Manuals 11/10         11/8   MH in flexion 10         10   PROM digits 5         10   Ulnar nerve glides 5             20         20   Neuro Re-Ed                                       clothespins 4 min red/green         4 min red   Grooved pegs 1 board         1 board   Putty press  Yellow 3 min                          10         10   Ther Ex             Wrist AROM 3# 3x10         3# 3x10   fingerweb Green 30x          Green 30x   flexbar sup/pron Red 30x         Red 30x   flexbar towel twist Red 30x         Red 30x   digiweb Light green 30x         Lgith green 30x    20         20   Ther Activity                                       Gait Training                                       Modalities

## 2023-11-13 ENCOUNTER — OFFICE VISIT (OUTPATIENT)
Dept: PHYSICAL THERAPY | Facility: MEDICAL CENTER | Age: 63
End: 2023-11-13
Payer: COMMERCIAL

## 2023-11-13 DIAGNOSIS — R29.898 RIGHT HAND WEAKNESS: Primary | ICD-10-CM

## 2023-11-13 PROCEDURE — 97112 NEUROMUSCULAR REEDUCATION: CPT | Performed by: PHYSICAL THERAPIST

## 2023-11-13 PROCEDURE — 97140 MANUAL THERAPY 1/> REGIONS: CPT | Performed by: PHYSICAL THERAPIST

## 2023-11-13 NOTE — PROGRESS NOTES
Daily Note/Re-evaluation     Today's date: 2023  Patient name: Goran Lai  : 1960  MRN: [id number]  Referring provider: Marlen Neely DO  Dx:   Encounter Diagnosis     ICD-10-CM    1. Right hand weakness  R29.898                      Subjective: Pt reports she is able to use her hand more for cleaning, washing herself, cooking, carrying items. Hand tends to cramp and get tired. Sometimes pain along ulnar column of hand and wrist.       Objective: See treatment diary below      Assessment: Tolerated treatment well. Patient exhibited good technique with therapeutic exercises and would benefit from continued PT  Sensation . 2g in SF unchanged  AROM, full comp fist of digits  Full thumb flexion  Intrinsic lag for PIP extension;active IF 10 degrees; RF 10 degrees; SF 15 degrees  Wrist strength 4+/5  Thenar strength 4+, add 4+  Dorsal interossei 4- to 4/5  Volar interossei 3/5, unable to adduct SF to RF ( wartenberg sign)   II R/L 20/ 70 lbs  Lp 5/ 19 lbs    Goals  STG (2-3 weeks)  1: Pt independent in HEP- met  2: Improve AROM 10-15 degrees- met  3: improve thumb abd/ext 10 degrees- met  4: full PROM- met  5: decrease edema 25%- met    LTG (2-3 weeks)  1: Improve FOTO 10-15 pts- not met  2: full active comp fist- met  3: Pt able to manipulate small objects without difficulty or fatigue- partially met  4: improve sensation to 0.2 g or better- met  5:  strength within 25 lbs of uninvolved side- not met  6: pinch strength within 5 lbs of uninvolved side- not met        Plan: Continue per plan of care.  Continue 2x/ week for 6 weeks     Precautions: DOI 23  HEP: PROM digits, TGE's, digit abd/add, thumb opp, thumb rad/palm abd, towel bunches, putty roll/pinch, foam squeezes      Manuals 11/10 11/13        11/8   MH in flexion 10 10        10   PROM digits 5 5        10   Ulnar nerve glides 5 5            20 20        20   Neuro Re-Ed                                       clothespins 4 min red/green 4 min red/green         4 min red   Grooved pegs 1 board 1 board        1 board   Putty press  Yellow 3 min  Yellow 3 min                         10 10        10   Ther Ex             Wrist AROM 3# 3x10 3# 3x10        3# 3x10   fingerweb Green 30x  Green 30x         Green 30x   flexbar sup/pron Red 30x Red 30x        Red 30x   flexbar towel twist Red 30x Red 30x        Red 30x   digiweb Light green 30x  Light green 30x        Lgith green 30x    20 20        20   Ther Activity                                       Gait Training                                       Modalities

## 2023-11-15 ENCOUNTER — APPOINTMENT (OUTPATIENT)
Dept: PHYSICAL THERAPY | Facility: MEDICAL CENTER | Age: 63
End: 2023-11-15
Payer: COMMERCIAL

## 2023-11-16 ENCOUNTER — APPOINTMENT (OUTPATIENT)
Dept: PHYSICAL THERAPY | Facility: MEDICAL CENTER | Age: 63
End: 2023-11-16
Payer: COMMERCIAL

## 2023-11-20 ENCOUNTER — OFFICE VISIT (OUTPATIENT)
Dept: PHYSICAL THERAPY | Facility: MEDICAL CENTER | Age: 63
End: 2023-11-20
Payer: COMMERCIAL

## 2023-11-20 DIAGNOSIS — R29.898 RIGHT HAND WEAKNESS: Primary | ICD-10-CM

## 2023-11-20 PROCEDURE — 97110 THERAPEUTIC EXERCISES: CPT | Performed by: PHYSICAL THERAPIST

## 2023-11-20 PROCEDURE — 97140 MANUAL THERAPY 1/> REGIONS: CPT | Performed by: PHYSICAL THERAPIST

## 2023-11-20 PROCEDURE — 97112 NEUROMUSCULAR REEDUCATION: CPT | Performed by: PHYSICAL THERAPIST

## 2023-11-20 NOTE — PROGRESS NOTES
Daily Note     Today's date: 2023  Patient name: Maurice Asher  : 1960  MRN: [id number]  Referring provider: Ashlee Diaz DO  Dx:   Encounter Diagnosis     ICD-10-CM    1. Right hand weakness  R29.898                      Subjective: Pt reported she had some nerve pain and tension along her ulnar wrist and forearm. Objective: See treatment diary below      Assessment: Tolerated treatment well. Patient exhibited good technique with therapeutic exercises and would benefit from continued PT  Performed ulnar nerve glides more proximal today, pt tolerated well  No issues with strengthening ex's today had some cramping in her finger flexors post putty press ex      Plan: Continue per plan of care.       Precautions: DOI 23  HEP: PROM digits, TGE's, digit abd/add, thumb opp, thumb rad/palm abd, towel bunches, putty roll/pinch, foam squeezes      Manuals 11/10 11/13 11/20       11/8   MH in flexion 10 10 10       10   PROM digits 5 5 5       10   Ulnar nerve glides 5 5 5           20 20 20       20   Neuro Re-Ed                                       clothespins 4 min red/green 4 min red/green  4 min green/blue/red       4 min red   Grooved pegs 1 board 1 board 1 board       1 board   Putty press  Yellow 3 min  Yellow 3 min  Yellow 3 min                        10 10 10       10   Ther Ex             Wrist AROM 3# 3x10 3# 3x10 3# 3x10       3# 3x10   fingerweb Green 30x  Green 30x  Green 30x        Green 30x   flexbar sup/pron Red 30x Red 30x Red 30x       Red 30x   flexbar towel twist Red 30x Red 30x Red 30x       Red 30x   digiweb Light green 30x  Light green 30x Light green 30x       Lgith green 30x    20 20 15       20   Ther Activity                                       Gait Training                                       Modalities

## 2023-11-22 ENCOUNTER — APPOINTMENT (OUTPATIENT)
Dept: PHYSICAL THERAPY | Facility: MEDICAL CENTER | Age: 63
End: 2023-11-22
Payer: COMMERCIAL

## 2023-11-27 ENCOUNTER — OFFICE VISIT (OUTPATIENT)
Dept: PHYSICAL THERAPY | Facility: MEDICAL CENTER | Age: 63
End: 2023-11-27
Payer: COMMERCIAL

## 2023-11-27 DIAGNOSIS — R29.898 RIGHT HAND WEAKNESS: Primary | ICD-10-CM

## 2023-11-27 PROCEDURE — 97140 MANUAL THERAPY 1/> REGIONS: CPT | Performed by: PHYSICAL THERAPIST

## 2023-11-27 PROCEDURE — 97112 NEUROMUSCULAR REEDUCATION: CPT | Performed by: PHYSICAL THERAPIST

## 2023-11-27 PROCEDURE — 97110 THERAPEUTIC EXERCISES: CPT | Performed by: PHYSICAL THERAPIST

## 2023-11-27 NOTE — PROGRESS NOTES
Daily Note     Today's date: 2023  Patient name: Teresa Vázquez  : 1960  MRN: [id number]  Referring provider: Denzel Rolon DO  Dx:   Encounter Diagnosis     ICD-10-CM    1. Right hand weakness  R29.898                      Subjective: Pt reports her hand is doing better. Objective: See treatment diary below      Assessment: Tolerated treatment well. Patient exhibited good technique with therapeutic exercises and would benefit from continued PT  Pt has improved IP extension against gravity, raiza to reach about 10 degrees of PIP extension. She still is unable to adduct/abduct her digits  No complaints post session  Plan: Continue per plan of care.       Precautions: DOI 23  HEP: PROM digits, TGE's, digit abd/add, thumb opp, thumb rad/palm abd, towel bunches, putty roll/pinch, foam squeezes      Manuals 11/10 11/13 11/20 11/27         MH in flexion 10 10 10 10         PROM digits 5 5 5 5         Ulnar nerve glides 5 5 5 5          20 20 20 20         Neuro Re-Ed                                       clothespins 4 min red/green 4 min red/green  4 min green/blue/red 4 min green/blue         Grooved pegs 1 board 1 board 1 board 1 board         Putty press  Yellow 3 min  Yellow 3 min  Yellow 3 min Yellow 3 min                       10 10 10 10         Ther Ex             Wrist AROM 3# 3x10 3# 3x10 3# 3x10 3# 3x10         fingerweb Green 30x  Green 30x  Green 30x Green 30x         flexbar sup/pron Red 30x Red 30x Red 30x  Red 30x         flexbar towel twist Red 30x Red 30x Red 30x  Red 30x         digiweb Light green 30x  Light green 30x Light green 30x Light green 30x          20 20 15 15         Ther Activity                                       Gait Training                                       Modalities

## 2023-11-29 ENCOUNTER — OFFICE VISIT (OUTPATIENT)
Dept: PHYSICAL THERAPY | Facility: MEDICAL CENTER | Age: 63
End: 2023-11-29
Payer: COMMERCIAL

## 2023-11-29 DIAGNOSIS — R29.898 RIGHT HAND WEAKNESS: Primary | ICD-10-CM

## 2023-11-29 PROCEDURE — 97110 THERAPEUTIC EXERCISES: CPT | Performed by: PHYSICAL THERAPIST

## 2023-11-29 PROCEDURE — 97112 NEUROMUSCULAR REEDUCATION: CPT | Performed by: PHYSICAL THERAPIST

## 2023-11-29 PROCEDURE — 97140 MANUAL THERAPY 1/> REGIONS: CPT | Performed by: PHYSICAL THERAPIST

## 2023-11-29 NOTE — PROGRESS NOTES
Daily Note     Today's date: 2023  Patient name: Carmine Chauhan  : 1960  MRN: [id number]  Referring provider: Jena Pisano DO  Dx:   Encounter Diagnosis     ICD-10-CM    1. Right hand weakness  R29.898                      Subjective: Pt reported she has been able to carry items better with her R hand, d more around the house. Her SF was giving a lot of burning pain the past two days. Objective: See treatment diary below      Assessment: Tolerated treatment well. Patient exhibited good technique with therapeutic exercises and would benefit from continued PT  Reviewed ulnar nerve glides with Pt  Improved tolerance and form with lateral and 3pt pinch during clothespins exercise  Slight improvement with her PIP extension, less hyperextension at MCP joint. Still unable to adduct digits      Plan: Continue per plan of care.       Precautions: DOI 23  HEP: PROM digits, TGE's, digit abd/add, thumb opp, thumb rad/palm abd, towel bunches, putty roll/pinch, foam squeezes      Manuals 11/10 11/13 11/20 11/27 11/29        MH in flexion 10 10 10 10 10        PROM digits 5 5 5 5 5        Ulnar nerve glides 5 5 5 5 5         20 20 20 20 20        Neuro Re-Ed                                       clothespins 4 min red/green 4 min red/green  4 min green/blue/red 4 min green/red 4 min green/red        Grooved pegs 1 board 1 board 1 board 1 board 1 board        Putty press  Yellow 3 min  Yellow 3 min  Yellow 3 min Yellow 3 min Yellow 3 min                      10 10 10 10 10        Ther Ex             Wrist AROM 3# 3x10 3# 3x10 3# 3x10 3# 3x10 3# 3x10        fingerweb Green 30x  Green 30x  Green 30x Green 30x  Green 30x        flexbar sup/pron Red 30x Red 30x Red 30x  Red 30x Red 30x        flexbar towel twist Red 30x Red 30x Red 30x  Red 30x Red 30x        digiweb Light green 30x  Light green 30x Light green 30x Light green 30x Light green 30x         20 20 15 15 15        Ther Activity Gait Training                                       Modalities

## 2023-12-07 ENCOUNTER — OFFICE VISIT (OUTPATIENT)
Dept: PHYSICAL THERAPY | Facility: MEDICAL CENTER | Age: 63
End: 2023-12-07
Payer: COMMERCIAL

## 2023-12-07 DIAGNOSIS — R29.898 RIGHT HAND WEAKNESS: Primary | ICD-10-CM

## 2023-12-07 PROCEDURE — 97112 NEUROMUSCULAR REEDUCATION: CPT | Performed by: PHYSICAL THERAPIST

## 2023-12-07 PROCEDURE — 97140 MANUAL THERAPY 1/> REGIONS: CPT | Performed by: PHYSICAL THERAPIST

## 2023-12-07 PROCEDURE — 97110 THERAPEUTIC EXERCISES: CPT | Performed by: PHYSICAL THERAPIST

## 2023-12-07 NOTE — PROGRESS NOTES
Daily Note     Today's date: 2023  Patient name: Carmine Chauhan  : 1960  MRN: [id number]  Referring provider: Jena Pisano DO  Dx:   Encounter Diagnosis     ICD-10-CM    1. Right hand weakness  R29.898                      Subjective: Pt reports she has better extension of her digits. Spoke with her boss, they will be able to provide her light duty. Objective: See treatment diary below      Assessment: Tolerated treatment well. Patient exhibited good technique with therapeutic exercises  ECU 4+  Th Add 4+  ADM 4-  Dorsal interossei  4th, unable to adduct SF and abduct RF and MF  Active extension SF PIP joint 15 degrees, improvement from resting 30 degrees posture before        Plan: Continue per plan of care.       Precautions: DOI 23  HEP: PROM digits, TGE's, digit abd/add, thumb opp, thumb rad/palm abd, towel bunches, putty roll/pinch, foam squeezes      Manuals 11/10 11/13 11/20 11/27 11/29 12       MH in flexion 10 10 10 10 10 10       PROM digits 5 5 5 5 5 5       Ulnar nerve glides 5 5 5 5 5 5        20 20 20 20 20 20       Neuro Re-Ed                                       clothespins 4 min red/green 4 min red/green  4 min green/blue/red 4 min green/red 4 min green/red 4 min green/red       Grooved pegs 1 board 1 board 1 board 1 board 1 board 1 board       Putty press  Yellow 3 min  Yellow 3 min  Yellow 3 min Yellow 3 min Yellow 3 min Yellow 3 min                     10 10 10 10 10 10       Ther Ex             Wrist AROM 3# 3x10 3# 3x10 3# 3x10 3# 3x10 3# 3x10 3# 3x10       fingerweb Green 30x  Green 30x  Green 30x Green 30x  Green 30x Blue 30x       flexbar sup/pron Red 30x Red 30x Red 30x  Red 30x Red 30x Red 30x       flexbar towel twist Red 30x Red 30x Red 30x  Red 30x Red 30x Red 30x       digiweb Light green 30x  Light green 30x Light green 30x Light green 30x Light green 30x Light green 30x        20 20 15 15 15 15       Ther Activity                                       Gait Training                                       Modalities

## 2023-12-11 ENCOUNTER — OFFICE VISIT (OUTPATIENT)
Dept: OBGYN CLINIC | Facility: MEDICAL CENTER | Age: 63
End: 2023-12-11
Payer: COMMERCIAL

## 2023-12-11 VITALS
HEIGHT: 63 IN | HEART RATE: 71 BPM | DIASTOLIC BLOOD PRESSURE: 75 MMHG | WEIGHT: 147.2 LBS | BODY MASS INDEX: 26.08 KG/M2 | SYSTOLIC BLOOD PRESSURE: 160 MMHG

## 2023-12-11 DIAGNOSIS — B02.29 POSTHERPETIC NEURALGIA: Primary | ICD-10-CM

## 2023-12-11 DIAGNOSIS — R29.898 WEAKNESS OF RIGHT HAND: ICD-10-CM

## 2023-12-11 DIAGNOSIS — M20.091 ULNAR DEVIATION OF FINGER OF RIGHT HAND: ICD-10-CM

## 2023-12-11 PROCEDURE — 99213 OFFICE O/P EST LOW 20 MIN: CPT | Performed by: ORTHOPAEDIC SURGERY

## 2023-12-11 NOTE — PROGRESS NOTES
HAND & UPPER EXTREMITY OFFICE VISIT   Referred By:  Raymon Ambrosio Md  [address]  [address] [place] [address] [place],  [place] [address]      Chief Complaint:     Right wrist pain     Previous History:   Patient was initially evaluated on 10/11/2023 for postherpetic neuralgia and ulnar nerve weakness/paresthesias. She was ordered physical therapy and given a Medrol Dosepak for swelling. .    Interval History:  Patient doing well, she has noticed significant improvements from attending PT and after the [place]. She does report tingling of the right fourth and fifth fingers. Pain is noted to her fourth and fifth finger starting to bend down and slightly if she is not actively trying to extend them. She has been wearing a hand compression sleeve as well.      Past Medical History:  Past Medical History:   Diagnosis Date    Abnormal LFTs     resolved    Carpal tunnel syndrome on right     Depression     last assessed 11/11/13    Herpes zoster right arm 7/28/2023    PTSD (post-traumatic stress disorder)     Vasovagal syncope     last assessed 06/23/14     Past Surgical History:   Procedure Laterality Date    CHOLECYSTECTOMY      COLONOSCOPY      TRIGGER FINGER RELEASE Right      Family History   Problem Relation Age of Onset    No Known Problems Daughter     Breast cancer Neg Hx      Social History     Socioeconomic History    Marital status: Single     Spouse name: Not on file    Number of children: Not on file    Years of education: Not on file    Highest education level: Not on file   Occupational History    Not on file   Tobacco Use    Smoking status: Never    Smokeless tobacco: Never   Substance and Sexual Activity    Alcohol use: No    Drug use: No    Sexual activity: Not on file   Other Topics Concern    Not on file   Social History Narrative         Social Determinants of Health     Financial Resource Strain: Not on file   Food Insecurity: Not on file   Transportation Needs: Not on file   Physical Activity: Not on file   Stress: Not on file   Social Connections: Not on file   Intimate Partner Violence: Not on file   Housing Stability: Not on file     Scheduled Meds:  Continuous Infusions:No current facility-administered medications for this visit. PRN Meds:. Allergies   Allergen Reactions    Gadolinium Anaphylaxis    Codeine Syncope    Oxycodone GI Intolerance     Was seen at ER 8/16/23    Plaquenil [Hydroxychloroquine] GI Intolerance    Trazodone Nausea Only    Amoxicillin Rash    Iodinated Contrast Media Hives and Itching     Annotation - [id number]: throat itching    Metrizamide Hives and Itching       Physical Examination:    /75   Pulse 71   Ht 5' 3" (1.6 m)   Wt 66.8 kg (147 lb 3.2 oz)   BMI 26.08 kg/m²     Gen: A&Ox3, NAD  Cardiac: regular rate  Chest: non labored breathing  Abdomen: Non-distended      Right Upper Extremity:  Skin CDI  Minimal swelling  No obvious deformity of the shoulder, arm, elbow, forearm, wrist  Positive tinels 3cm proximal to the wrist crease over the ulnar nerve  Slight clawing of the fourth and fifth fingers  Able to make a composite fist  Sensation intact to light touch in the axillary median, ulnar, and radial nerve distributions  5/5 motor   2+RP    Studies:  No imaging performed during today's visit. Assessment and Plan:  1. Postherpetic neuralgia R Upper Extremity  Durable Medical Equipment      2. Weakness of right hand assoc w shingles        3. 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 female presents in follow up for the above diagnosis. Patient presented on exam today. I would expect her to continue to improve with time and therapy. We again reviewed that the nerve regenerates at 1mm a day. we also discussed that the etiology of her new clawing is likely because the nerve has re-innervated her FDP muscle belly but has not reached her intrinsic muscles yet.   She does have a positive Tinel's just proximal to Guyon's canal do think this is likely the region her nerve recovery has reached thus far. I recommend she continues to wear her compression sleeve as it is providing her with relief. I will plan to see her back in 3 months for repeat evaluation. she expressed understanding of the plan and agreed. We encouraged them to contact our office with any questions or concerns. Maynor Acosta MD  Hand and Upper Extremity Surgery      *This note was dictated using Dragon voice recognition software. Please excuse any word substitutions or errors. *    I have personally seen and examined the patient, utilizing Lucius Tristan, a Certified Athletic Trainer for assistance with documentation. The entire visit including physical exam and formulation/discussion of plan was performed by me.

## 2023-12-12 ENCOUNTER — OFFICE VISIT (OUTPATIENT)
Dept: PHYSICAL THERAPY | Facility: MEDICAL CENTER | Age: 63
End: 2023-12-12
Payer: COMMERCIAL

## 2023-12-12 ENCOUNTER — OFFICE VISIT (OUTPATIENT)
Dept: FAMILY MEDICINE CLINIC | Facility: CLINIC | Age: 63
End: 2023-12-12
Payer: COMMERCIAL

## 2023-12-12 VITALS
SYSTOLIC BLOOD PRESSURE: 132 MMHG | TEMPERATURE: 97.3 F | BODY MASS INDEX: 26.05 KG/M2 | OXYGEN SATURATION: 98 % | HEART RATE: 80 BPM | WEIGHT: 147 LBS | HEIGHT: 63 IN | DIASTOLIC BLOOD PRESSURE: 72 MMHG

## 2023-12-12 DIAGNOSIS — B02.29 POSTHERPETIC NEURALGIA: Primary | ICD-10-CM

## 2023-12-12 DIAGNOSIS — R29.898 WEAKNESS OF RIGHT HAND: ICD-10-CM

## 2023-12-12 DIAGNOSIS — M20.091 ULNAR DEVIATION OF FINGER OF RIGHT HAND: ICD-10-CM

## 2023-12-12 DIAGNOSIS — R29.898 RIGHT HAND WEAKNESS: Primary | ICD-10-CM

## 2023-12-12 PROCEDURE — 97110 THERAPEUTIC EXERCISES: CPT | Performed by: PHYSICAL THERAPIST

## 2023-12-12 PROCEDURE — 97140 MANUAL THERAPY 1/> REGIONS: CPT | Performed by: PHYSICAL THERAPIST

## 2023-12-12 PROCEDURE — 99212 OFFICE O/P EST SF 10 MIN: CPT | Performed by: FAMILY MEDICINE

## 2023-12-12 PROCEDURE — 97112 NEUROMUSCULAR REEDUCATION: CPT | Performed by: PHYSICAL THERAPIST

## 2023-12-12 NOTE — PROGRESS NOTES
Daily Note     Today's date: 2023  Patient name: Migdalia Dodson  : 1960  MRN: [id number]  Referring provider: Brendon Pugh DO  Dx:   Encounter Diagnosis     ICD-10-CM    1. Right hand weakness  R29.898                      Subjective: Pt reports her SF is still very sore and painful. Objective: See treatment diary below      Assessment: Tolerated treatment well. Patient exhibited good technique with therapeutic exercises and would benefit from continued PT  Improved symptoms after stretching and neural glides  Pt initially had improved PIP extension of her ulnar digits, lessened after exercises most likely from gripping  Pt able to tolerate heavier resistance for lateral pinch in clothespins    Plan: Continue per plan of care.       Precautions: DOI 23  HEP: PROM digits, TGE's, digit abd/add, thumb opp, thumb rad/palm abd, towel bunches, putty roll/pinch, foam squeezes      Manuals 11/10 11/13 11/20 11/27 11/29 12/7 12/12      MH in flexion 10 10 10 10 10 10 10      PROM digits 5 5 5 5 5 5 5      Ulnar nerve glides 5 5 5 5 5 5 5       20 20 20 20 20 20 20      Neuro Re-Ed                                       clothespins 4 min red/green 4 min red/green  4 min green/blue/red 4 min green/red 4 min green/red 4 min green/red 4 min green/blue       Grooved pegs 1 board 1 board 1 board 1 board 1 board 1 board 1 board      Putty press  Yellow 3 min  Yellow 3 min  Yellow 3 min Yellow 3 min Yellow 3 min Yellow 3 min Yellow 3 min                    10 10 10 10 10 10 10      Ther Ex             Wrist AROM 3# 3x10 3# 3x10 3# 3x10 3# 3x10 3# 3x10 3# 3x10 3# 3x10      fingerweb Green 30x  Green 30x  Green 30x Green 30x  Green 30x Blue 30x  Blue 30x      flexbar sup/pron Red 30x Red 30x Red 30x  Red 30x Red 30x Red 30x Red 30x      flexbar towel twist Red 30x Red 30x Red 30x  Red 30x Red 30x Red 30x Red 30x      digiweb Light green 30x  Light green 30x Light green 30x Light green 30x Light green 30x Light green 30x Light green 30x       20 20 15 15 15 15 15      Ther Activity                                       Gait Training                                       Modalities

## 2023-12-12 NOTE — PATIENT INSTRUCTIONS
We did review her visit with orthopedic hand specialist yesterday, she is continuing to slowly improve but still does have some decreased fine motor, lateral drift fifth finger, degree contracture with decreased range of motion hand. She is doing okay off gabapentin. She has been out of work since July 24, 2023. 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 She does believe she would be able to perform these duties. She will get us forms to fill out. She will see orthopedic hand specialist again in 3 months, she will continue with twice weekly physical therapy, she is continuing to do home physical therapy routinely, using glove routinely. Early November blood work for rheumatology showed CBC essentially unchanged, WBC 3.6. CMP unremarkable, glucose 92. She does blood work for rheumatology roughly every 3 months. We will see her again with a routine physical in February, we will plan to order lipid panel then. She did have TSH back in August which was okay at 3.22.     She will continue other medication as is

## 2023-12-12 NOTE — PROGRESS NOTES
FAMILY PRACTICE OFFICE VISIT  Nancy Kevin D.O. [address] [facility]  [address] [place] [address].  [place], [place], [address]      NAME: Trae Bush  AGE: 70 y.o. 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 She is doing okay off gabapentin. She has been out of work since 2023. 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 She does believe she would be able to perform these duties. She will get us forms to fill out. She will see orthopedic hand specialist again in 3 months, she will continue with twice weekly physical therapy, she is continuing to do home physical therapy routinely, using glove routinely. Early November blood work for rheumatology showed CBC essentially unchanged, WBC 3.6. CMP unremarkable, glucose 92. She does blood work for rheumatology roughly every 3 months. We will see her again with a routine physical in February, we will plan to order lipid panel then. She did have TSH back in August which was okay at 3.22.     She will continue other medication as is        Chief Complaint     Chief Complaint   Patient presents with    Follow-up       History of Present Illness   Noel Pallas is a 70y.o.-year-old female who is seen to recheck postherpetic neuralgia with weakness right hand, she continues to slowly improve, did see hand specialist yesterday. Review of Systems   Review of Systems   Constitutional:         She has no other new issues, no fevers or chills, no chest pain, no shortness of breath, no change in bowel or bladder. 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 BP Readings from Last 3 Encounters:   12/12/23 132/72   12/11/23 160/75   10/31/23 139/67       Wt Readings from Last 3 Encounters:   12/12/23 66.7 kg (147 lb)   12/11/23 66.8 kg (147 lb 3.2 oz)   10/31/23 66.2 kg (146 lb)       Physical Exam  Constitutional:       Comments: Pleasant 40-year-old seated in chair, looks well but does have ongoing weakness right hand, has lateral drift right fifth finger, has degree contracture, some decreased fine motor but is improving. Good full range of motion shoulder, C-spine unremarkable.   See orthopedic hand specialty note from yesterday ALLERGIES:  Allergies   Allergen Reactions    Gadolinium Anaphylaxis    Codeine Syncope    Oxycodone GI Intolerance     Was seen at ER 8/16/23    Plaquenil [Hydroxychloroquine] GI Intolerance    Trazodone Nausea Only    Amoxicillin Rash    Iodinated Contrast Media Hives and Itching     Annotation - [id number]: throat itching    Metrizamide Hives and Itching       Current Medications     Current Outpatient Medications   Medication Sig Dispense Refill    Calcium Carb-Cholecalciferol (CALCIUM 600 + D PO) Take 1 tablet by mouth daily      Cholecalciferol (VITAMIN D3) 50 MCG (2000 UT) capsule Take 1 capsule (2,000 Units total) by mouth daily      escitalopram (LEXAPRO) 5 mg tablet Take 1 tablet (5 mg total) by mouth every morning 90 tablet 1    Multiple Vitamins-Minerals (ONE-A-DAY 50 PLUS PO) Take 1 tablet by mouth daily      mycophenolate (CELLCEPT) 500 mg tablet Take 2 tablets (1,000 mg total) by mouth 2 (two) times a day 1 tablet 0    Omega-3 Fatty Acids (OMEGA 3 PO) Take 500 mg by mouth daily Once daily      rosuvastatin (CRESTOR) 5 mg tablet Take 1 tablet (5 mg total) by mouth 3 (three) times a week 45 tablet 3    ketoconazole (NIZORAL) 2 % cream APPLY TOPICALLY DAILY FOR 14 DAYS (Patient not taking: Reported on 10/11/2023)       No current facility-administered medications for this visit. No orders of the defined types were placed in this encounter.         Lalito Mart DO

## 2023-12-14 ENCOUNTER — OFFICE VISIT (OUTPATIENT)
Dept: PHYSICAL THERAPY | Facility: MEDICAL CENTER | Age: 63
End: 2023-12-14
Payer: COMMERCIAL

## 2023-12-14 DIAGNOSIS — R29.898 RIGHT HAND WEAKNESS: Primary | ICD-10-CM

## 2023-12-14 PROCEDURE — 97110 THERAPEUTIC EXERCISES: CPT | Performed by: PHYSICAL THERAPIST

## 2023-12-14 PROCEDURE — 97140 MANUAL THERAPY 1/> REGIONS: CPT | Performed by: PHYSICAL THERAPIST

## 2023-12-14 PROCEDURE — 97112 NEUROMUSCULAR REEDUCATION: CPT | Performed by: PHYSICAL THERAPIST

## 2023-12-14 NOTE — PROGRESS NOTES
Daily Note     Today's date: 2023  Patient name: Teresa Vázquez  : 1960  MRN: [id number]  Referring provider: Denzel Rolon DO  Dx:   Encounter Diagnosis     ICD-10-CM    1. Right hand weakness  R29.898                      Subjective: Pt reports her hand is doing okay. SF still can't move toward other fingers but feels like she is able to separate middle digits a little bit more      Objective: See treatment diary below      Assessment: Tolerated treatment well. Patient exhibited good technique with therapeutic exercises and would benefit from continued PT  Pt able to abduct about 10-15 degrees MF from RF actively but unable to maintain SF adduction   II R 25 lbs  Increased resistance to program, pt tolerated well    Plan: Continue per plan of care.       Precautions: DOI 23  HEP: PROM digits, TGE's, digit abd/add, thumb opp, thumb rad/palm abd, towel bunches, putty roll/pinch, foam squeezes      Manuals 11/10 11/13 11/20 11/27 11/29 12/7 12/12 12/14     MH in flexion 10 10 10 10 10 10 10 10     PROM digits 5 5 5 5 5 5 5 5     Ulnar nerve glides 5 5 5 5 5 5 5 5                   20 20 20 20 20 20 20 20     Neuro Re-Ed                                       clothespins 4 min red/green 4 min red/green  4 min green/blue/red 4 min green/red 4 min green/red 4 min green/red 4 min green/blue  4 min green/blue     Grooved pegs 1 board 1 board 1 board 1 board 1 board 1 board 1 board 1 board     Putty press  Yellow 3 min  Yellow 3 min  Yellow 3 min Yellow 3 min Yellow 3 min Yellow 3 min Yellow 3 min  Red 3 min                   10 10 10 10 10 10 10 10     Ther Ex             Wrist AROM 3# 3x10 3# 3x10 3# 3x10 3# 3x10 3# 3x10 3# 3x10 3# 3x10 4# 3x10     fingerweb Green 30x  Green 30x  Green 30x Green 30x  Green 30x Blue 30x  Blue 30x Blue 30x     flexbar sup/pron Red 30x Red 30x Red 30x  Red 30x Red 30x Red 30x Red 30x Green 30x     flexbar towel twist Red 30x Red 30x Red 30x  Red 30x Red 30x Red 30x Red 30x Green 30x     digiweb Light green 30x  Light green 30x Light green 30x Light green 30x Light green 30x Light green 30x Light green 30x Yellow 30x      20 20 15 15 15 15 15 15     Ther Activity                                       Gait Training                                       Modalities

## 2023-12-15 ENCOUNTER — TELEPHONE (OUTPATIENT)
Dept: FAMILY MEDICINE CLINIC | Facility: CLINIC | Age: 63
End: 2023-12-15

## 2023-12-19 ENCOUNTER — APPOINTMENT (OUTPATIENT)
Dept: PHYSICAL THERAPY | Facility: MEDICAL CENTER | Age: 63
End: 2023-12-19
Payer: COMMERCIAL

## 2023-12-21 ENCOUNTER — APPOINTMENT (OUTPATIENT)
Dept: PHYSICAL THERAPY | Facility: MEDICAL CENTER | Age: 63
End: 2023-12-21
Payer: COMMERCIAL

## 2023-12-28 ENCOUNTER — OFFICE VISIT (OUTPATIENT)
Dept: PHYSICAL THERAPY | Facility: MEDICAL CENTER | Age: 63
End: 2023-12-28
Payer: COMMERCIAL

## 2023-12-28 DIAGNOSIS — R29.898 RIGHT HAND WEAKNESS: Primary | ICD-10-CM

## 2023-12-28 PROCEDURE — 97140 MANUAL THERAPY 1/> REGIONS: CPT | Performed by: PHYSICAL THERAPIST

## 2023-12-28 PROCEDURE — 97112 NEUROMUSCULAR REEDUCATION: CPT | Performed by: PHYSICAL THERAPIST

## 2023-12-28 PROCEDURE — 97110 THERAPEUTIC EXERCISES: CPT | Performed by: PHYSICAL THERAPIST

## 2023-12-28 NOTE — PROGRESS NOTES
Daily Note     Today's date: 2023  Patient name: Trae Bush  : 1960  MRN: [id number]  Referring provider: Jeff Kevin DO  Dx:   Encounter Diagnosis     ICD-10-CM    1. Right hand weakness  R29.898                      Subjective: Pt reports her hand has been feeling okay. She was sick over the holiday. RTW for 4 hrs/day starting       Objective: See treatment diary below      Assessment: Tolerated treatment well. Patient exhibited good technique with therapeutic exercises and would benefit from continued PT  Pt's ROM stable,  extension of IP joints the same.   Pt tolerated exercises well, able to tolerate increased resistance to clothespins  No complaints post session    Plan: Continue per plan of care.      Precautions: DOI 23  HEP: PROM digits, TGE's, digit abd/add, thumb opp, thumb rad/palm abd, towel bunches, putty roll/pinch, foam squeezes      Manuals 11/10 11/13 11/20 11/27 11/29 12/7 12/12 12/14 12/28    MH in flexion 10 10 10 10 10 10 10 10 10    PROM digits 5 5 5 5 5 5 5 5 5    Ulnar nerve glides 5 5 5 5 5 5 5 5 5                  20 20 20 20 20 20 20 20 20    Neuro Re-Ed                                       clothespins 4 min red/green 4 min red/green  4 min green/blue/red 4 min green/red 4 min green/red 4 min green/red 4 min green/blue  4 min green/blue 4 min blue/black    Grooved pegs 1 board 1 board 1 board 1 board 1 board 1 board 1 board 1 board 1 baord    Putty press  Yellow 3 min  Yellow 3 min  Yellow 3 min Yellow 3 min Yellow 3 min Yellow 3 min Yellow 3 min  Red 3 min  Red 3 min                  10 10 10 10 10 10 10 10 10    Ther Ex             Wrist AROM 3# 3x10 3# 3x10 3# 3x10 3# 3x10 3# 3x10 3# 3x10 3# 3x10 4# 3x10 4# 3x10    fingerweb Green 30x  Green 30x  Green 30x Green 30x  Green 30x Blue 30x  Blue 30x Blue 30x  Blue 30x    flexbar sup/pron Red 30x Red 30x Red 30x  Red 30x Red 30x Red 30x Red 30x Green 30x Green 30x    flexbar towel twist Red 30x Red 30x Red 30x  Red  30x Red 30x Red 30x Red 30x  Green 30x Gren 30x    digiweb Light green 30x  Light green 30x Light green 30x Light green 30x Light green 30x Light green 30x Light green 30x Yellow 30x  Yellow 30x     20 20 15 15 15 15 15 15 15    Ther Activity                                       Gait Training                                       Modalities

## 2024-01-02 ENCOUNTER — OFFICE VISIT (OUTPATIENT)
Dept: PHYSICAL THERAPY | Facility: MEDICAL CENTER | Age: 64
End: 2024-01-02
Payer: COMMERCIAL

## 2024-01-02 DIAGNOSIS — R29.898 RIGHT HAND WEAKNESS: Primary | ICD-10-CM

## 2024-01-02 PROCEDURE — 97140 MANUAL THERAPY 1/> REGIONS: CPT | Performed by: PHYSICAL THERAPIST

## 2024-01-02 PROCEDURE — 97112 NEUROMUSCULAR REEDUCATION: CPT | Performed by: PHYSICAL THERAPIST

## 2024-01-02 PROCEDURE — 97110 THERAPEUTIC EXERCISES: CPT | Performed by: PHYSICAL THERAPIST

## 2024-01-02 NOTE — PROGRESS NOTES
Daily Note     Today's date: 2024  Patient name: Trae Bush  : 1960  MRN: [id number]  Referring provider: Jeff Kevin DO  Dx:   Encounter Diagnosis     ICD-10-CM    1. Right hand weakness  R29.898                      Subjective: Pt reports she started back at work today for part time. It went well, was able to grab items well without difficulty. Still having difficulty with turning bottle caps.       Objective: See treatment diary below      Assessment: Tolerated treatment well. Patient exhibited good technique with therapeutic exercises and would benefit from continued PT  Pt has improved PIP extension, decreased lag. Still unable to adduct SF and Abduct RF and MF fully  Pt reported she has been feeling stronger  Provided her with stronger resistance putty for home to work on cap twists  Plan: Continue per plan of care.      Precautions: DOI 23  HEP: PROM digits, TGE's, digit abd/add, thumb opp, thumb rad/palm abd, towel bunches, putty roll/pinch, foam squeezes      Manuals 11/10 11/13 11/20 11/27 11/29 12/7 12/12 12/14 12/28 1/   MH in flexion 10 10 10 10 10 10 10 10 10 10   PROM digits 5 5 5 5 5 5 5 5 5 5   Ulnar nerve glides 5 5 5 5 5 5 5 5 5                  20 20 20 20 20 20 20 20 20 15   Neuro Re-Ed                                       clothespins 4 min red/green 4 min red/green  4 min green/blue/red 4 min green/red 4 min green/red 4 min green/red 4 min green/blue  4 min green/blue 4 min blue/black 4 min blue/black   Grooved pegs 1 board 1 board 1 board 1 board 1 board 1 board 1 board 1 board 1 board 1 board   Putty press  Yellow 3 min  Yellow 3 min  Yellow 3 min Yellow 3 min Yellow 3 min Yellow 3 min Yellow 3 min  Red 3 min  Red 3 min Red 3 min   Putty bottle cap          Red 3 min    10 10 10 10 10 10 10 10 10 15   Ther Ex             Wrist AROM 3# 3x10 3# 3x10 3# 3x10 3# 3x10 3# 3x10 3# 3x10 3# 3x10 4# 3x10 4# 3x10 4# 3x10   fingerweb Green 30x  Green 30x  Green 30x Green 30x  Green 30x  Blue 30x  Blue 30x Blue 30x  Blue 30x  Blue 30x   flexbar sup/pron Red 30x Red 30x Red 30x  Red 30x Red 30x Red 30x Red 30x Green 30x Green 30x  Green 30x   flexbar towel twist Red 30x Red 30x Red 30x  Red 30x Red 30x Red 30x Red 30x  Green 30x Gren 30x Green 30x   digiweb Light green 30x  Light green 30x Light green 30x Light green 30x Light green 30x Light green 30x Light green 30x Yellow 30x  Yellow 30x Yellow 30x    20 20 15 15 15 15 15 15 15 15   Ther Activity                                       Gait Training                                       Modalities

## 2024-01-04 ENCOUNTER — APPOINTMENT (OUTPATIENT)
Dept: PHYSICAL THERAPY | Facility: MEDICAL CENTER | Age: 64
End: 2024-01-04
Payer: COMMERCIAL

## 2024-01-09 ENCOUNTER — OFFICE VISIT (OUTPATIENT)
Dept: PHYSICAL THERAPY | Facility: MEDICAL CENTER | Age: 64
End: 2024-01-09
Payer: COMMERCIAL

## 2024-01-09 DIAGNOSIS — R29.898 RIGHT HAND WEAKNESS: Primary | ICD-10-CM

## 2024-01-09 PROCEDURE — 97140 MANUAL THERAPY 1/> REGIONS: CPT | Performed by: PHYSICAL THERAPIST

## 2024-01-09 PROCEDURE — 97110 THERAPEUTIC EXERCISES: CPT | Performed by: PHYSICAL THERAPIST

## 2024-01-09 PROCEDURE — 97112 NEUROMUSCULAR REEDUCATION: CPT | Performed by: PHYSICAL THERAPIST

## 2024-01-09 NOTE — PROGRESS NOTES
Daily Note/re-eval     Today's date: 2024  Patient name: Trae Bush  : 1960  MRN: [id number]  Referring provider: Jeff Kevin DO  Dx:   Encounter Diagnosis     ICD-10-CM    1. Right hand weakness  R29.898                      Subjective: Pt reports her hand has been feeling better, no longer getting significant pain, sensation is improving along with her strength, able to move her finger together and apart better.       Objective: See treatment diary below      Assessment: Tolerated treatment well. Patient exhibited good technique with therapeutic exercises and would benefit from continued PT  Pt able adduct SF further, slight flexion at MCP joint  Slight spread of middle digits   II R 30 lbs (+5)  No complaints post session     II R 35 ( +5)  Lp 8  3pt 8  Active PIP extension IF, RF, SF 20 degrees; full extension with MCP blocked  ECU 4+  Th add 4  Interossei 3+  Goals  STG (2-3 weeks)  1: Pt independent in HEP- met  2: Improve AROM 10-15 degrees- met  3: improve thumb abd/ext 10 degrees- met  4: full PROM-met  5: decrease edema 25%- met    LTG (2-3 weeks)  1: Improve FOTO 10-15 pts- met  2: full active comp fist- met  3: Pt able to manipulate small objects without difficulty or fatigue-met  4: improve sensation to 0.2 g or better- met  5:  strength within 25 lbs of uninvolved side- not met  6: pinch strength within 5 lbs of uninvolved side- not met    Plan: Continue per plan of care.  D/C NV     Precautions: DOI 23  HEP: PROM digits, TGE's, digit abd/add, thumb opp, thumb rad/palm abd, towel bunches, putty roll/pinch, foam squeezes      Manuals             MH 10            PROM digits 5            Ulnar nerve glides 5             15                         Neuro Re-Ed                                       clothespins 4 min blue/black            Grooved pegs 1 board            Putty press Red 3 min            Putty bottle cap Red 3 min             15            Ther Ex              Wrist AROM 4# 2x10            fingerweb Blue 30x            flexbar sup/pron  Green 30x            flexbar towel twist Green 30x            digiweb  Yellow 30x             20            Ther Activity                                       Gait Training                                       Modalities

## 2024-01-10 DIAGNOSIS — L93.2 CUTANEOUS LUPUS ERYTHEMATOSUS: ICD-10-CM

## 2024-01-10 RX ORDER — MYCOPHENOLATE MOFETIL 500 MG/1
500 TABLET ORAL 2 TIMES DAILY
Start: 2024-01-10

## 2024-01-11 ENCOUNTER — OFFICE VISIT (OUTPATIENT)
Dept: PHYSICAL THERAPY | Facility: MEDICAL CENTER | Age: 64
End: 2024-01-11
Payer: COMMERCIAL

## 2024-01-11 DIAGNOSIS — R29.898 RIGHT HAND WEAKNESS: Primary | ICD-10-CM

## 2024-01-11 PROCEDURE — 97140 MANUAL THERAPY 1/> REGIONS: CPT | Performed by: PHYSICAL THERAPIST

## 2024-01-16 ENCOUNTER — APPOINTMENT (OUTPATIENT)
Dept: PHYSICAL THERAPY | Facility: MEDICAL CENTER | Age: 64
End: 2024-01-16
Payer: COMMERCIAL

## 2024-01-18 ENCOUNTER — APPOINTMENT (OUTPATIENT)
Dept: PHYSICAL THERAPY | Facility: MEDICAL CENTER | Age: 64
End: 2024-01-18
Payer: COMMERCIAL

## 2024-02-12 ENCOUNTER — TELEPHONE (OUTPATIENT)
Dept: FAMILY MEDICINE CLINIC | Facility: CLINIC | Age: 64
End: 2024-02-12

## 2024-02-12 NOTE — TELEPHONE ENCOUNTER
Called Pt to reschedule appt for tomorrow or switch appt to virtual due to inclement weather. No answer LISA.

## 2024-02-14 ENCOUNTER — TELEPHONE (OUTPATIENT)
Dept: FAMILY MEDICINE CLINIC | Facility: CLINIC | Age: 64
End: 2024-02-14

## 2024-02-14 NOTE — TELEPHONE ENCOUNTER
2/14 10:50am: PT walked in to drop off paperwork for PCP to review and complete. Disability paperwork is for [employer] Per PT: PCP has completed them before. I am working 4 hours per day, for a total of 20 hours per week, until my appt on 3/15. Please fax completed paperwork, then shred. Paperwork has been placed in PCP folder.

## 2024-02-16 NOTE — TELEPHONE ENCOUNTER
[employer] paperwork was faxed to number [phone].    Fax confirmation received 2-16-24 / 11:59 AM

## 2024-03-08 ENCOUNTER — RA CDI HCC (OUTPATIENT)
Dept: OTHER | Facility: HOSPITAL | Age: 64
End: 2024-03-08

## 2024-03-08 NOTE — PROGRESS NOTES
HCC coding opportunities          Chart Reviewed number of suggestions sent to Provider: 1   F33.1    This is a reminder to address (resolve/update/assess) ALL HCC (risk adjustment) codes as found on active problem list for 2024 as patient scores reset to zero YANDEL.  Also, just a reminder to please review and assess all other chronic conditions for 2024  Patients Insurance        Commercial Insurance: [employer]

## 2024-03-14 NOTE — PATIENT INSTRUCTIONS
Reviewed health history along with medications.  Overall she is doing well other than ongoing issues right hand.    1. Encounter for annual physical exam    2. Cutaneous lupus erythematosus  Assessment & Plan:  Rheumatology decreased CellCept to 500 twice daily in January, she will continue to see rheumatology with routine blood work.  Sees dermatology yearly      3. Postherpetic neuralgia R Upper Extremity    4. Weakness of right hand assoc w shingles  Assessment & Plan:  Appears to have plateaued with improvement, does continue with decreased fine motor along with weakness, continue with home exercise program, last therapy was in January, had seen hand specialist in the past.  I will fill out form for her employment, she did return to work at the beginning of this year, she will continue working 4 hours/day, 20 hours/week at current restrictions, reevaluate in 4 months.        5. Hypercholesterolemia  Assessment & Plan:  Slip given to redo lipid panel with next blood work for rheumatology, stay on rosuvastatin 5 mg 3 times weekly, 1 year ago HDL 57, .    Orders:  -     Lipid panel    6. Screening mammogram, encounter for  -     Mammo screening bilateral w 3d & cad; Future; Expected date: 06/24/2024    7. Adnexal mass / Dermoid Ovary left  Assessment & Plan:  She will call her GYN to set up follow-up, I would like her to redo pelvic ultrasound at [facility], compared to last ultrasound 2022, previously 4 x 4 x 6 mm dermoid left ovary    Orders:  -     US pelvis complete non OB    8. Borderline blood pressure  Assessment & Plan:  Blood pressure today after sitting 134/84, she does check blood pressure readings at home, we do want to keep this at least less than 140/90, preferably 120-130/07-13-[id number] at follow-up, consider adding low-dose ARB      9. Need for Tdap vaccination  -     TDAP VACCINE GREATER THAN OR EQUAL TO 8YO IM    10. 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  TSH was ok 8/23.    She is up to date with Diabetes screening. Glucose 92 ( 1/24)    Immunization History   Administered Date(s) Administered    COVID-19 J&J (Carol) vaccine 0.5 mL 04/10/2021    INFLUENZA 09/11/2013, 10/28/2020, 11/03/2021, 11/22/2022, 10/24/2023    Influenza Quadrivalent, 6-35 Months IM 10/24/2015    Influenza, injectable, quadrivalent, preservative free 0.5 mL 11/05/2020    Influenza, recombinant, quadrivalent,injectable, preservative free 12/03/2018    Influenza, seasonal, injectable 09/17/2012, 10/01/2013, 10/20/2014, 10/20/2015    Pneumococcal Conjugate Vaccine 20-valent (Pcv20), Polysace 01/24/2023    Td (adult), adsorbed 09/17/2012    Tdap 11/11/2014    Tuberculin Skin Test-PPD Intradermal 06/18/2012, 06/20/2012       Discussed Vaccines,    Pneumococcal vax  is up to date  She does do yearly Flu shot.   Tdap/tetanus shot is due, given here today (done every 10 yrs for superficial cuts, every 5 yrs for deep wounds)  Can do first 'shingles' shot, Shingrix at next office visit-had severe case zoster right upper extremity July last year  Covid vaccine received x 1      Was never a smoker     Regarding Colon Cancer screening,   Screening is up to date - Colonoscopy was neg w [facility] 8/2018- redo 10 yrs was advised    She last saw her Gynecologist at [facility] 7/22 -she will contact Dr. Mack's office and set up a follow-up.  See above regarding dermoid ovary  Mammogram screening was discussed, is  up-to-date. Redo 6-7/24  Discussed bone density screening/ DEXA Scan, she relates rheumatology will be ordering this.     We discussed end of life planning, she does not have a  \"LIVING WILL\" -discussed, paperwork given    Regarding Hepatitis C Screening,   previously perfomed and was negative    Glaucoma screening is up-to-date    Discussed importance of routine exercise, healthy diet.    We will see her back in 4 months, sooner as " needed.    Advance Directives   What are advance directives?  Advance directives are legal documents that state your wishes and plans for medical care. These plans are made ahead of time in case you lose your ability to make decisions for yourself. Advance directives can apply to any medical decision, such as the treatments you want, and if you want to donate organs.   What are the types of advance directives?  There are many types of advance directives, and each state has rules about how to use them. You may choose a combination of any of the following:  Living will:  This is a written record of the treatment you want. You can also choose which treatments you do not want, which to limit, and which to stop at a certain time. This includes surgery, medicine, IV fluid, and tube feedings.   Durable power of  for healthcare (DPAHC):  This is a written record that states who you want to make healthcare choices for you when you are unable to make them for yourself. This person, called a proxy, is usually a family member or a friend. You may choose more than 1 proxy.  Do not resuscitate (DNR) order:  A DNR order is used in case your heart stops beating or you stop breathing. It is a request not to have certain forms of treatment, such as CPR. A DNR order may be included in other types of advance directives.  Medical directive:  This covers the care that you want if you are in a coma, near death, or unable to make decisions for yourself. You can list the treatments you want for each condition. Treatment may include pain medicine, surgery, blood transfusions, dialysis, IV or tube feedings, and a ventilator (breathing machine).  Values history:  This document has questions about your views, beliefs, and how you feel and think about life. This information can help others choose the care that you would choose.  Why are advance directives important?  An advance directive helps you control your care. Although spoken  wishes may be used, it is better to have your wishes written down. Spoken wishes can be misunderstood, or not followed. Treatments may be given even if you do not want them. An advance directive may make it easier for your family to make difficult choices about your care.

## 2024-03-15 ENCOUNTER — TELEPHONE (OUTPATIENT)
Dept: FAMILY MEDICINE CLINIC | Facility: CLINIC | Age: 64
End: 2024-03-15

## 2024-03-15 ENCOUNTER — OFFICE VISIT (OUTPATIENT)
Dept: FAMILY MEDICINE CLINIC | Facility: CLINIC | Age: 64
End: 2024-03-15
Payer: COMMERCIAL

## 2024-03-15 VITALS
OXYGEN SATURATION: 99 % | SYSTOLIC BLOOD PRESSURE: 134 MMHG | WEIGHT: 148.8 LBS | HEIGHT: 63 IN | DIASTOLIC BLOOD PRESSURE: 84 MMHG | TEMPERATURE: 97.6 F | RESPIRATION RATE: 16 BRPM | BODY MASS INDEX: 26.36 KG/M2 | HEART RATE: 67 BPM

## 2024-03-15 DIAGNOSIS — L93.2 CUTANEOUS LUPUS ERYTHEMATOSUS: ICD-10-CM

## 2024-03-15 DIAGNOSIS — Z12.31 SCREENING MAMMOGRAM, ENCOUNTER FOR: ICD-10-CM

## 2024-03-15 DIAGNOSIS — R29.898 WEAKNESS OF RIGHT HAND: ICD-10-CM

## 2024-03-15 DIAGNOSIS — Z00.00 ENCOUNTER FOR ANNUAL PHYSICAL EXAM: Primary | ICD-10-CM

## 2024-03-15 DIAGNOSIS — M20.091 ULNAR DEVIATION OF FINGER OF RIGHT HAND: ICD-10-CM

## 2024-03-15 DIAGNOSIS — B02.29 POSTHERPETIC NEURALGIA: ICD-10-CM

## 2024-03-15 DIAGNOSIS — N94.89 ADNEXAL MASS: ICD-10-CM

## 2024-03-15 DIAGNOSIS — E78.00 HYPERCHOLESTEROLEMIA: ICD-10-CM

## 2024-03-15 DIAGNOSIS — R03.0 BORDERLINE BLOOD PRESSURE: ICD-10-CM

## 2024-03-15 DIAGNOSIS — Z23 NEED FOR TDAP VACCINATION: ICD-10-CM

## 2024-03-15 PROBLEM — Z86.59 HISTORY OF DEPRESSION: Status: ACTIVE | Noted: 2023-01-24

## 2024-03-15 PROCEDURE — 99397 PER PM REEVAL EST PAT 65+ YR: CPT | Performed by: FAMILY MEDICINE

## 2024-03-15 PROCEDURE — 90471 IMMUNIZATION ADMIN: CPT

## 2024-03-15 PROCEDURE — 90715 TDAP VACCINE 7 YRS/> IM: CPT

## 2024-03-15 RX ORDER — DIPHENHYDRAMINE HCL 25 MG
25 TABLET ORAL
COMMUNITY

## 2024-03-15 NOTE — ASSESSMENT & PLAN NOTE
Blood pressure today after sitting 134/84, she does check blood pressure readings at home, we do want to keep this at least less than 140/90, preferably 120-130/42-16-wihehhw at follow-up, consider adding low-dose ARB

## 2024-03-15 NOTE — TELEPHONE ENCOUNTER
[employer] paperwork was faxed 3-15-24 to [phone].    Fax confirmation received 3-15-24 / 2:23 PM

## 2024-03-15 NOTE — ASSESSMENT & PLAN NOTE
Rheumatology decreased CellCept to 500 twice daily in January, she will continue to see rheumatology with routine blood work.  Sees dermatology yearly

## 2024-03-15 NOTE — PROGRESS NOTES
FAMILY PRACTICE OFFICE VISIT  Jeff Kevin D.O.    [facility]  [facility]  [address]   [address]  [place], [address]      NAME: Trae Bush  AGE: 63 y.o. SEX: female  : 1960   MRN: [id number]    DATE: 3/15/2024  TIME: 9:58 AM      Assessment and Plan         Patient Instructions   Reviewed health history along with medications.  Overall she is doing well other than ongoing issues right hand.    1. Encounter for annual physical exam    2. Cutaneous lupus erythematosus  Assessment & Plan:  Rheumatology decreased CellCept to 500 twice daily in January, she will continue to see rheumatology with routine blood work.  Sees dermatology yearly      3. Postherpetic neuralgia R Upper Extremity    4. Weakness of right hand assoc w shingles  Assessment & Plan:  Appears to have plateaued with improvement, does continue with decreased fine motor along with weakness, continue with home exercise program, last therapy was in January, had seen hand specialist in the past.  I will fill out form for her employment, she did return to work at the beginning of this year, she will continue working 4 hours/day, 20 hours/week at current restrictions, reevaluate in 4 months.        5. Hypercholesterolemia  Assessment & Plan:  Slip given to redo lipid panel with next blood work for rheumatology, stay on rosuvastatin 5 mg 3 times weekly, 1 year ago HDL 57, .    Orders:  -     Lipid panel    6. Screening mammogram, encounter for  -     Mammo screening bilateral w 3d & cad; Future; Expected date: 2024    7. Adnexal mass / Dermoid Ovary left  Assessment & Plan:  She will call her GYN to set up follow-up, I would like her to redo pelvic ultrasound at [facility], compared to last ultrasound , previously 4 x 4 x 6 mm dermoid left ovary    Orders:  -     US pelvis complete non OB    8. Borderline blood pressure  Assessment & Plan:  Blood pressure today after sitting 134/84, she does  check blood pressure readings at home, we do want to keep this at least less than 140/90, preferably 120-130/44-47-zenjsod at follow-up, consider adding low-dose ARB      9. Need for Tdap vaccination  -     TDAP VACCINE GREATER THAN OR EQUAL TO 8YO IM    10. Ulnar drift of fifth finger of right hand          We did review previous blood work from Jan, CBC, CMP were ok, does every 3 mo for Rheum.  TSH was ok 8/23.    She is up to date with Diabetes screening. Glucose 92 ( 1/24)    Immunization History   Administered Date(s) Administered    COVID-19 J&J ([employer]) vaccine 0.5 mL 04/10/2021    INFLUENZA 09/11/2013, 10/28/2020, 11/03/2021, 11/22/2022, 10/24/2023    Influenza Quadrivalent, 6-35 Months IM 10/24/2015    Influenza, injectable, quadrivalent, preservative free 0.5 mL 11/05/2020    Influenza, recombinant, quadrivalent,injectable, preservative free 12/03/2018    Influenza, seasonal, injectable 09/17/2012, 10/01/2013, 10/20/2014, 10/20/2015    Pneumococcal Conjugate Vaccine 20-valent (Pcv20), Polysace 01/24/2023    Td (adult), adsorbed 09/17/2012    Tdap 11/11/2014    Tuberculin Skin Test-PPD Intradermal 06/18/2012, 06/20/2012       Discussed Vaccines,    Pneumococcal vax  is up to date  She does do yearly Flu shot.   Tdap/tetanus shot is due, given here today (done every 10 yrs for superficial cuts, every 5 yrs for deep wounds)  Can do first 'shingles' shot, Shingrix at next office visit-had severe case zoster right upper extremity July last year  Covid vaccine received x 1      Was never a smoker     Regarding Colon Cancer screening,   Screening is up to date - Colonoscopy was neg w [facility] 8/2018- redo 10 yrs was advised    She last saw her Gynecologist at [facility] 7/22 -she will contact Dr. Mack's office and set up a follow-up.  See above regarding dermoid ovary  Mammogram screening was discussed, is  up-to-date. Redo 6-7/24  Discussed bone density screening/ DEXA Scan, she relates rheumatology will be ordering  "this.     We discussed end of life planning, she does not have a  \"LIVING WILL\" -discussed, paperwork given    Regarding Hepatitis C Screening,   previously perfomed and was negative    Glaucoma screening is up-to-date    Discussed importance of routine exercise, healthy diet.    We will see her back in 4 months, sooner as needed.    Advance Directives   What are advance directives?  Advance directives are legal documents that state your wishes and plans for medical care. These plans are made ahead of time in case you lose your ability to make decisions for yourself. Advance directives can apply to any medical decision, such as the treatments you want, and if you want to donate organs.   What are the types of advance directives?  There are many types of advance directives, and each state has rules about how to use them. You may choose a combination of any of the following:  Living will:  This is a written record of the treatment you want. You can also choose which treatments you do not want, which to limit, and which to stop at a certain time. This includes surgery, medicine, IV fluid, and tube feedings.   Durable power of  for healthcare (DPAHC):  This is a written record that states who you want to make healthcare choices for you when you are unable to make them for yourself. This person, called a proxy, is usually a family member or a friend. You may choose more than 1 proxy.  Do not resuscitate (DNR) order:  A DNR order is used in case your heart stops beating or you stop breathing. It is a request not to have certain forms of treatment, such as CPR. A DNR order may be included in other types of advance directives.  Medical directive:  This covers the care that you want if you are in a coma, near death, or unable to make decisions for yourself. You can list the treatments you want for each condition. Treatment may include pain medicine, surgery, blood transfusions, dialysis, IV or tube feedings, and a " ventilator (breathing machine).  Values history:  This document has questions about your views, beliefs, and how you feel and think about life. This information can help others choose the care that you would choose.  Why are advance directives important?  An advance directive helps you control your care. Although spoken wishes may be used, it is better to have your wishes written down. Spoken wishes can be misunderstood, or not followed. Treatments may be given even if you do not want them. An advance directive may make it easier for your family to make difficult choices about your care.                         Chief Complaint     Chief Complaint   Patient presents with    Physical Exam       History of Present Illness   Trae Bush is a 63 y.o.-year-old female who is in today for a physical exam, also in for reevaluation.  See previous notes regarding severe case of shingles right upper extremity last July requiring hospitalization, she does continue with hand weakness along with decreased fine motor due to this.  She did return to work 4 hours daily, 20 hours a week at the beginning of this year, she feels she is able to do her job with these restrictions.    She does continue to see rheumatology, CellCept was decreased back in January to 500 twice daily regarding cutaneous lupus.  She has had no new skin lesions, only sees dermatology yearly.    She stopped Lexapro last year, feels okay off medication, has 4-year-old granddaughter in [place]      Review of Systems   Review of Systems   Constitutional:  Negative for appetite change, fatigue, fever and unexpected weight change.   HENT:  Negative for sore throat and trouble swallowing.    Respiratory:  Negative for cough, chest tightness, shortness of breath and wheezing.    Cardiovascular:  Negative for chest pain, palpitations and leg swelling.   Gastrointestinal:  Negative for abdominal pain, blood in stool, nausea and vomiting.        Minimal acid reflux   -uses  "rare Tums  No change in bowel   Genitourinary:  Negative for dysuria and hematuria.   Neurological:  Negative for dizziness, syncope, light-headedness and headaches.   Psychiatric/Behavioral:  Positive for sleep disturbance (She feels she is sleeping okay with using Benadryl). Negative for behavioral problems and confusion.        Active Problem List     Patient Active Problem List   Diagnosis    Adnexal mass / Dermoid Ovary left    Allergic rhinitis    Anxiety    Contrast media adverse reaction    Hypercholesterolemia    Post traumatic stress disorder    Microscopic hematuria    ILANA (stress urinary incontinence, female)    Cutaneous lupus erythematosus    History of depression    Postherpetic neuralgia R Upper Extremity    Weakness of right hand assoc w shingles    Ulnar drift of fifth finger of right hand    Borderline blood pressure       Past Medical History:  Reviewed    Past Surgical History:  Reviewed    Family History:  Reviewed    Social History:  Reviewed    Objective     Vitals:    03/15/24 0922 03/15/24 0951   BP: 140/78 134/84   BP Location: Left arm    Patient Position: Sitting    Cuff Size: Standard    Pulse: 67    Resp: 16    Temp: 97.6 °F (36.4 °C)    TempSrc: Tympanic    SpO2: 99%    Weight: 67.5 kg (148 lb 12.8 oz)    Height: 5' 3\" (1.6 m)      Body mass index is 26.36 kg/m².    BP Readings from Last 3 Encounters:   03/15/24 134/84   12/12/23 132/72   12/11/23 160/75       Wt Readings from Last 3 Encounters:   03/15/24 67.5 kg (148 lb 12.8 oz)   12/12/23 66.7 kg (147 lb)   12/11/23 66.8 kg (147 lb 3.2 oz)       Physical Exam  Constitutional:       General: She is not in acute distress.     Appearance: Normal appearance. She is well-developed. She is not ill-appearing.   Eyes:      General: No scleral icterus.  Neck:      Vascular: No carotid bruit.   Cardiovascular:      Rate and Rhythm: Normal rate and regular rhythm.      Heart sounds: Normal heart sounds. No murmur heard.  Pulmonary:      " Effort: Pulmonary effort is normal. No respiratory distress.      Breath sounds: Normal breath sounds. No wheezing, rhonchi or rales.   Abdominal:      Palpations: Abdomen is soft.      Tenderness: There is no abdominal tenderness.   Musculoskeletal:      Right lower leg: No edema.      Left lower leg: No edema.   Lymphadenopathy:      Cervical: No cervical adenopathy.   Skin:     Coloration: Skin is not jaundiced.   Neurological:      Mental Status: She is alert and oriented to person, place, and time. Mental status is at baseline.   Psychiatric:         Mood and Affect: Mood normal.         Behavior: Behavior normal.        continues with decreased fine motor right hand, some atrophic changes, ulnar drift etc. as before, see photos below.  She was able to  but has decreased  strength.            ALLERGIES:  Allergies   Allergen Reactions    Gadolinium Anaphylaxis    Codeine Syncope    Oxycodone GI Intolerance     Was seen at ER 8/16/23    Plaquenil [Hydroxychloroquine] GI Intolerance    Trazodone Nausea Only    Amoxicillin Rash    Iodinated Contrast Media Hives and Itching     Annotation - [id number]: throat itching    Metrizamide Hives and Itching       Current Medications     Current Outpatient Medications   Medication Sig Dispense Refill    Calcium Carb-Cholecalciferol (CALCIUM 600 + D PO) Take 1 tablet by mouth daily      Cholecalciferol (VITAMIN D3) 50 MCG (2000 UT) capsule Take 1 capsule (2,000 Units total) by mouth daily      diphenhydrAMINE (BENADRYL) 25 mg tablet Take 25 mg by mouth daily at bedtime as needed for sleep or allergies      Multiple Vitamins-Minerals (ONE-A-DAY 50 PLUS PO) Take 1 tablet by mouth daily      mycophenolate (CELLCEPT) 500 mg tablet Take 1 tablet (500 mg total) by mouth 2 (two) times a day      Omega-3 Fatty Acids (OMEGA 3 PO) Take 500 mg by mouth daily Once daily      rosuvastatin (CRESTOR) 5 mg tablet Take 1 tablet (5 mg total) by mouth 3 (three) times a week 45 tablet 3      No current facility-administered medications for this visit.            Orders Placed This Encounter   Procedures    Mammo screening bilateral w 3d & cad    US pelvis complete non OB    TDAP VACCINE GREATER THAN OR EQUAL TO 6YO IM    Lipid panel         Jeff Kevin DO

## 2024-03-15 NOTE — ASSESSMENT & PLAN NOTE
She will call her GYN to set up follow-up, I would like her to redo pelvic ultrasound at [facility], compared to last ultrasound 2022, previously 4 x 4 x 6 mm dermoid left ovary

## 2024-03-15 NOTE — ASSESSMENT & PLAN NOTE
Slip given to redo lipid panel with next blood work for rheumatology, stay on rosuvastatin 5 mg 3 times weekly, 1 year ago HDL 57, .

## 2024-03-15 NOTE — ASSESSMENT & PLAN NOTE
Appears to have plateaued with improvement, does continue with decreased fine motor along with weakness, continue with home exercise program, last therapy was in January, had seen hand specialist in the past.  I will fill out form for her employment, she did return to work at the beginning of this year, she will continue working 4 hours/day, 20 hours/week at current restrictions, reevaluate in 4 months.

## 2024-04-25 DIAGNOSIS — E78.00 HYPERCHOLESTEROLEMIA: ICD-10-CM

## 2024-04-25 NOTE — TELEPHONE ENCOUNTER
Reason for call:   [x] Refill   [] Prior Auth  [] Other:     Office:   [x] PCP/Provider - Jeff Mora  [] Specialty/Provider -     Medication: rosuvastatin (CRESTOR) 5 mg tablet     Dose/Frequency:     Take 1 tablet (5 mg total) by mouth 3 (three) times a week       Quantity: #45    Pharmacy: [facility]/pharmacy #[id number] - [place] - [address] [phone]     Does the patient have enough for 3 days?   [x] Yes   [] No - Send as HP to POD

## 2024-04-26 RX ORDER — ROSUVASTATIN CALCIUM 5 MG/1
5 TABLET, COATED ORAL 3 TIMES WEEKLY
Qty: 45 TABLET | Refills: 5 | Status: SHIPPED | OUTPATIENT
Start: 2024-04-26

## 2024-04-30 ENCOUNTER — TELEPHONE (OUTPATIENT)
Age: 64
End: 2024-04-30

## 2024-04-30 NOTE — TELEPHONE ENCOUNTER
Please call her back, we did receive the results of the blood work ordered by her rheumatologist, her blood count, sugar, liver, kidney function all look stable.  Unfortunately [facility] lab did not run the fasting lipid panel I had requested, there is still an order in the [facility] lab system-if she goes to a [facility]'s lab they should be able to run that fasting blood test

## 2024-04-30 NOTE — TELEPHONE ENCOUNTER
Pt has been informed of results. She will go to [facility] to have lipid panel completed.

## 2024-05-18 ENCOUNTER — APPOINTMENT (OUTPATIENT)
Dept: LAB | Facility: MEDICAL CENTER | Age: 64
End: 2024-05-18
Payer: COMMERCIAL

## 2024-05-18 LAB
CHOLEST SERPL-MCNC: 235 MG/DL
HDLC SERPL-MCNC: 55 MG/DL
LDLC SERPL CALC-MCNC: 153 MG/DL (ref 0–100)
NONHDLC SERPL-MCNC: 180 MG/DL
TRIGL SERPL-MCNC: 134 MG/DL

## 2024-05-20 ENCOUNTER — TELEPHONE (OUTPATIENT)
Dept: FAMILY MEDICINE CLINIC | Facility: CLINIC | Age: 64
End: 2024-05-20

## 2024-05-20 NOTE — TELEPHONE ENCOUNTER
----- Message from Jeff eKvin DO sent at 5/20/2024 12:49 PM EDT -----  Cholesterol was 235, 2 years ago was 201.  I do not believe we need to start a medication yet, just try to continue healthy diet, keep appointment in July

## 2024-06-24 ENCOUNTER — HOSPITAL ENCOUNTER (OUTPATIENT)
Dept: MAMMOGRAPHY | Facility: MEDICAL CENTER | Age: 64
Discharge: HOME | End: 2024-06-24
Payer: COMMERCIAL

## 2024-06-24 VITALS — BODY MASS INDEX: 26.22 KG/M2 | HEIGHT: 63 IN | WEIGHT: 148 LBS

## 2024-06-24 DIAGNOSIS — Z12.31 SCREENING MAMMOGRAM, ENCOUNTER FOR: ICD-10-CM

## 2024-06-24 PROCEDURE — 77067 SCR MAMMO BI INCL CAD: CPT

## 2024-06-24 PROCEDURE — 77063 BREAST TOMOSYNTHESIS BI: CPT

## 2024-06-25 ENCOUNTER — TELEPHONE (OUTPATIENT)
Dept: FAMILY MEDICINE CLINIC | Facility: CLINIC | Age: 64
End: 2024-06-25

## 2024-06-25 NOTE — TELEPHONE ENCOUNTER
----- Message from Jeff Kevin DO sent at 6/25/2024 12:54 PM EDT -----  Mammogram was okay, redo mammogram again in 1 year

## 2024-06-25 NOTE — TELEPHONE ENCOUNTER
Called Pt to go over PCP message. No answer left a detail message with this information, and requesting Pt to call back if she had any questions or concerns.

## 2024-07-09 ENCOUNTER — RA CDI HCC (OUTPATIENT)
Dept: OTHER | Facility: HOSPITAL | Age: 64
End: 2024-07-09

## 2024-07-09 NOTE — PROGRESS NOTES
HCC coding opportunities          Chart Reviewed number of suggestions sent to Provider: 1   F33.1    Patients Insurance        Commercial Insurance: [employer]

## 2024-07-15 ENCOUNTER — TELEPHONE (OUTPATIENT)
Dept: FAMILY MEDICINE CLINIC | Facility: CLINIC | Age: 64
End: 2024-07-15

## 2024-07-15 ENCOUNTER — OFFICE VISIT (OUTPATIENT)
Dept: FAMILY MEDICINE CLINIC | Facility: CLINIC | Age: 64
End: 2024-07-15
Payer: COMMERCIAL

## 2024-07-15 VITALS
WEIGHT: 150 LBS | DIASTOLIC BLOOD PRESSURE: 84 MMHG | SYSTOLIC BLOOD PRESSURE: 128 MMHG | OXYGEN SATURATION: 97 % | BODY MASS INDEX: 26.57 KG/M2 | TEMPERATURE: 97.6 F | HEART RATE: 74 BPM

## 2024-07-15 DIAGNOSIS — R29.898 WEAKNESS OF RIGHT HAND: Primary | ICD-10-CM

## 2024-07-15 DIAGNOSIS — Z23 NEED FOR VACCINATION FOR ZOSTER: ICD-10-CM

## 2024-07-15 DIAGNOSIS — N94.89 ADNEXAL MASS: ICD-10-CM

## 2024-07-15 DIAGNOSIS — Z23 ENCOUNTER FOR IMMUNIZATION: ICD-10-CM

## 2024-07-15 DIAGNOSIS — B02.29 POSTHERPETIC NEURALGIA: ICD-10-CM

## 2024-07-15 DIAGNOSIS — R03.0 BORDERLINE BLOOD PRESSURE: ICD-10-CM

## 2024-07-15 DIAGNOSIS — M20.091 ULNAR DEVIATION OF FINGER OF RIGHT HAND: ICD-10-CM

## 2024-07-15 DIAGNOSIS — L93.2 CUTANEOUS LUPUS ERYTHEMATOSUS: ICD-10-CM

## 2024-07-15 DIAGNOSIS — E78.00 HYPERCHOLESTEROLEMIA: ICD-10-CM

## 2024-07-15 PROCEDURE — 99214 OFFICE O/P EST MOD 30 MIN: CPT | Performed by: FAMILY MEDICINE

## 2024-07-15 NOTE — ASSESSMENT & PLAN NOTE
In the past cholesterol has ranged between 193 and 258 generally, in May her cholesterol was 235 with HDL 55, .  She has roughly 7% 10-year cardiovascular risk, she will continue rosuvastatin 5 mg 3 times weekly, check lipids at least yearly.

## 2024-07-15 NOTE — ASSESSMENT & PLAN NOTE
She is now about 1 year out status post severe case of shingles right upper extremity into hand, does continue with decreased pincer  with thumb, index finger on right hand, slight decreased  strength, continues with ulnar drift, also has some atrophy thenar.  See Image    Has completed formal therapy, continue with home exercises regarding strengthening right hand.  Had seen hand specialist the past, she held off on follow-up, she will continue to see rheumatology.    At this point appears to have reached almost maximum potential, improvement.  At the beginning of the year she had gone back to work 20 hours/week with restrictions, she wishes to try return to full work duties.    I did write a letter for her stating she can resume 40-hour workweek without restriction as of September 3, 2024.  She feels she can accommodate ongoing deficit right hand and still perform her job duties.  She will call us if anything worsens.

## 2024-07-15 NOTE — LETTER
July 15, 2024     Patient: Trae Bush  YOB: 1960  Date of Visit: 7/15/2024      To Whom it May Concern:    Trae Bush remains under our medical care.  She does continue with weakness, decreased fine motor right hand related to severe case of shingles last summer.  She has been working 20-hour work week with restricted duties since the beginning of this year.    She wishes to work full duty, 40 hours weekly, after discussion she can try that starting Tuesday, September 3, 2024.  If she does note increased issues she will call.    If you have any questions or concerns, please don't hesitate to call.         Sincerely,          Jeff Kevin, DO

## 2024-07-15 NOTE — LETTER
July 15, 2024     No Recipients    Patient: Trae Bush   YOB: 1960   Date of Visit: 7/15/2024       Dear Dr. Dolan Recipients:    Thank you for referring Trae Bush to me for evaluation. Below are the relevant portions of my assessment and plan of care.         If you have questions, please do not hesitate to call me. I look forward to following Trae along with you.         Sincerely,        Jeff Kevin DO        CC: No Recipients

## 2024-07-15 NOTE — TELEPHONE ENCOUNTER
[employer] paperwork was faxed to [phone].   Also please mail the ppwk after we fax.

## 2024-07-15 NOTE — PATIENT INSTRUCTIONS
1. Weakness of right hand assoc w shingles  2. 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 Postherpetic neuralgia R Upper Extremity  Assessment & Plan:  She is now about 1 year out status post severe case of shingles right upper extremity into hand, does continue with decreased pincer  with thumb, index finger on right hand, slight decreased  strength, continues with ulnar drift, also has some atrophy thenar.  See Image    Has completed formal therapy, continue with home exercises regarding strengthening right hand.  Had seen hand specialist the past, she held off on follow-up, she will continue to see rheumatology.    At this point appears to have reached almost maximum potential, improvement.  At the beginning of the year she had gone back to work 20 hours/week with restrictions, she wishes to try return to full work duties.    I did write a letter for her stating she can resume 40-hour workweek without restriction as of September 3, 2024.  She feels she can accommodate ongoing deficit right hand and still perform her job duties.  She will call us if anything worsens.  4. Hypercholesterolemia  Assessment & Plan:  In the past cholesterol has ranged between 193 and 258 generally, in May her cholesterol was 235 with HDL 55, .  She has roughly 7% 10-year cardiovascular risk, she will continue rosuvastatin 5 mg 3 times weekly, check lipids at least yearly.  5. Borderline blood pressure  Assessment & Plan:  Blood pressure is okay here today at 128/84, continue to monitor  6. Adnexal mass / Dermoid Ovary left  Assessment & Plan:  She saw [facility] in follow-up, had ultrasound pelvis in June, calcified left ovarian lesion, she will continue follow-up with them.  7. Cutaneous lupus erythematosus  Assessment & Plan:  She will continue to see dermatology.  Also does see rheumatology      TSH was okay last August.    Will see her again in 4 months, call sooner if needed  She will see her  rheumatologist in follow-up in August.  Does do blood work with them, back in April had CBC, CMP, WBC was 3.6.    First zoster vaccine was unavailable to be given today, she will return for that when available-we will plan to do #2 at her next visit in 4 months

## 2024-07-15 NOTE — TELEPHONE ENCOUNTER
7/15 4:20pm: Faxed Disability paperwork, 7/15/24 Office Notes and Letter to [employer]

## 2024-07-15 NOTE — ASSESSMENT & PLAN NOTE
She saw [facility] GYN in follow-up, had ultrasound pelvis in June, calcified left ovarian lesion, she will continue follow-up with them.

## 2024-07-15 NOTE — PROGRESS NOTES
FAMILY PRACTICE OFFICE VISIT  Jeff Kevin D.O.    [facility]  [facility]  [address]   [address]  [place], [address]      NAME: Trae Bush  AGE: 64 y.o. SEX: female  : 1960   MRN: [id number]    DATE: 7/15/2024  TIME: 12:25 PM      Assessment and Plan         Patient Instructions   1. Weakness of right hand assoc w shingles  2. 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 Postherpetic neuralgia R Upper Extremity  Assessment & Plan:  She is now about 1 year out status post severe case of shingles right upper extremity into hand, does continue with decreased pincer  with thumb, index finger on right hand, slight decreased  strength, continues with ulnar drift, also has some atrophy thenar.  See Image    Has completed formal therapy, continue with home exercises regarding strengthening right hand.  Had seen hand specialist the past, she held off on follow-up, she will continue to see rheumatology.    At this point appears to have reached almost maximum potential, improvement.  At the beginning of the year she had gone back to work 20 hours/week with restrictions, she wishes to try return to full work duties.    I did write a letter for her stating she can resume 40-hour workweek without restriction as of September 3, 2024.  She feels she can accommodate ongoing deficit right hand and still perform her job duties.  She will call us if anything worsens.  4. Hypercholesterolemia  Assessment & Plan:  In the past cholesterol has ranged between 193 and 258 generally, in May her cholesterol was 235 with HDL 55, .  She has roughly 7% 10-year cardiovascular risk, she will continue rosuvastatin 5 mg 3 times weekly, check lipids at least yearly.  5. Borderline blood pressure  Assessment & Plan:  Blood pressure is okay here today at 128/84, continue to monitor  6. Adnexal mass / Dermoid Ovary left  Assessment & Plan:  She saw [facility] in follow-up, had  ultrasound pelvis in June, calcified left ovarian lesion, she will continue follow-up with them.  7. Cutaneous lupus erythematosus  Assessment & Plan:  She will continue to see dermatology.  Also does see rheumatology      TSH was okay last August.    Will see her again in 4 months, call sooner if needed  She will see her rheumatologist in follow-up in August.  Does do blood work with them, back in April had CBC, CMP, WBC was 3.6.    First zoster vaccine was unavailable to be given today, she will return for that when available-we will plan to do #2 at her next visit in 4 months    Chief Complaint     Chief Complaint   Patient presents with    Follow-up       History of Present Illness   Trae Bush is a 64 y.o.-year-old female who is in for a recheck, I saw her 4 months ago.  She continues to work 4 hours daily 5 days a week, has been trying to compensate with the left hand as she continues with decreased pincer  right hand, weakness, ulnar drift.  Has completed therapy, is trying to do home exercises.    She does wish to try to return to full 40-hour workweek, she feels she can perform current duties adequately despite having ongoing issues with right hand    Otherwise feels well, does continue to see dermatology along with rheumatology, is on CellCept      Review of Systems   Review of Systems   Constitutional:  Negative for appetite change, fatigue, fever and unexpected weight change.   HENT:  Negative for sore throat and trouble swallowing.    Respiratory:  Negative for cough, chest tightness, shortness of breath and wheezing.    Cardiovascular:  Negative for chest pain, palpitations and leg swelling.   Gastrointestinal:  Negative for abdominal pain, blood in stool, nausea and vomiting.        No acid reflux     No change in bowel   Genitourinary:  Negative for dysuria and hematuria.   Neurological:  Negative for dizziness, syncope, light-headedness and headaches.   Psychiatric/Behavioral:  Negative for  behavioral problems and confusion.        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    BP Readings from Last 3 Encounters:   07/15/24 128/84   03/15/24 134/84   12/12/23 132/72       Wt Readings from Last 3 Encounters:   07/15/24 68 kg (150 lb)   06/24/24 67.1 kg (148 lb)   03/15/24 67.5 kg (148 lb 12.8 oz)       Physical Exam  Constitutional:       Comments: Pleasant 64-year-old seated in chair, comfortable at rest.    See photo/image, she does have ongoing ulnar drift, atrophic change of hand thenar musculature, slight decreased fine motor along with decreased  right hand.   Eyes:      General: No scleral icterus.  Cardiovascular:      Rate and Rhythm: Normal rate and regular rhythm.      Heart sounds: Normal heart sounds.   Pulmonary:      Effort: No respiratory distress.      Breath sounds: Normal breath sounds.   Abdominal:      Tenderness: There is no abdominal tenderness.   Musculoskeletal:      Right lower leg: No edema.      Left lower leg: No edema.   Skin:     Coloration: Skin is not jaundiced.   Neurological:      Mental Status: Mental status is at baseline.   Psychiatric:          Behavior: Behavior normal.               ALLERGIES:  Allergies   Allergen Reactions    Gadolinium Anaphylaxis    Codeine Syncope    Oxycodone GI Intolerance     Was seen at ER 8/16/23    Plaquenil [Hydroxychloroquine] GI Intolerance    Trazodone Nausea Only    Amoxicillin Rash    Iodinated Contrast Media Hives and Itching     Annotation - [id number]: throat itching    Metrizamide Hives and Itching       Current Medications     Current Outpatient Medications   Medication Sig Dispense Refill    Calcium Carb-Cholecalciferol (CALCIUM 600 + D PO) Take 1 tablet by mouth daily      Cholecalciferol (VITAMIN D3) 50 MCG (2000 UT) capsule Take 1 capsule (2,000 Units total) by mouth daily      diphenhydrAMINE (BENADRYL) 25 mg tablet Take 25 mg by mouth daily at bedtime as needed for sleep or allergies      Multiple Vitamins-Minerals (ONE-A-DAY 50 PLUS PO) Take 1 tablet by mouth daily      mycophenolate (CELLCEPT) 500 mg tablet Take 1 tablet (500 mg total) by mouth 2 (two) times a day      Omega-3 Fatty Acids (OMEGA 3 PO) Take 500 mg by mouth daily Once daily      rosuvastatin (CRESTOR) 5 mg tablet Take 1 tablet (5 mg total) by mouth 3 (three) times a week 45 tablet 5     No current facility-administered medications for this visit.            Orders Placed This Encounter   Procedures    Zoster Vaccine Recombinant IM         Jeff Kevin DO

## 2024-07-15 NOTE — TELEPHONE ENCOUNTER
Called Pt to ask where she works so we can fax over the medical excuse letter. Upon return call inform name of company/location/phone number.

## 2024-07-17 ENCOUNTER — CLINICAL SUPPORT (OUTPATIENT)
Dept: FAMILY MEDICINE CLINIC | Facility: CLINIC | Age: 64
End: 2024-07-17
Payer: COMMERCIAL

## 2024-07-17 DIAGNOSIS — Z23 ENCOUNTER FOR IMMUNIZATION: Primary | ICD-10-CM

## 2024-07-17 PROCEDURE — 90750 HZV VACC RECOMBINANT IM: CPT

## 2024-07-17 PROCEDURE — 90471 IMMUNIZATION ADMIN: CPT

## 2024-09-26 ENCOUNTER — TELEPHONE (OUTPATIENT)
Dept: ADMINISTRATIVE | Facility: OTHER | Age: 64
End: 2024-09-26

## 2024-09-26 NOTE — TELEPHONE ENCOUNTER
Upon review of the In Basket request we were able to locate, review, and update the patient chart as requested for CRC: Colonoscopy and DEXA Scan.    Any additional questions or concerns should be emailed to the Practice Liaisons via the appropriate education email address, please do not reply via In Basket.    Thank you  Maria Del Carmen Childs MA   [facility]

## 2024-11-27 ENCOUNTER — RA CDI HCC (OUTPATIENT)
Dept: OTHER | Facility: HOSPITAL | Age: 64
End: 2024-11-27

## 2024-12-03 ENCOUNTER — OFFICE VISIT (OUTPATIENT)
Dept: FAMILY MEDICINE CLINIC | Facility: CLINIC | Age: 64
End: 2024-12-03
Payer: COMMERCIAL

## 2024-12-03 VITALS
HEART RATE: 68 BPM | RESPIRATION RATE: 12 BRPM | DIASTOLIC BLOOD PRESSURE: 94 MMHG | WEIGHT: 151 LBS | BODY MASS INDEX: 26.75 KG/M2 | SYSTOLIC BLOOD PRESSURE: 148 MMHG | OXYGEN SATURATION: 99 % | HEIGHT: 63 IN

## 2024-12-03 DIAGNOSIS — B02.29 POSTHERPETIC NEURALGIA: ICD-10-CM

## 2024-12-03 DIAGNOSIS — R03.0 BLOOD PRESSURE ELEVATED WITHOUT HISTORY OF HTN: ICD-10-CM

## 2024-12-03 DIAGNOSIS — E78.00 HYPERCHOLESTEROLEMIA: ICD-10-CM

## 2024-12-03 DIAGNOSIS — L93.2 CUTANEOUS LUPUS ERYTHEMATOSUS: ICD-10-CM

## 2024-12-03 DIAGNOSIS — R29.898 WEAKNESS OF RIGHT HAND: ICD-10-CM

## 2024-12-03 DIAGNOSIS — M20.091 ULNAR DEVIATION OF FINGER OF RIGHT HAND: ICD-10-CM

## 2024-12-03 DIAGNOSIS — Z23 ENCOUNTER FOR IMMUNIZATION: Primary | ICD-10-CM

## 2024-12-03 PROCEDURE — 99213 OFFICE O/P EST LOW 20 MIN: CPT | Performed by: FAMILY MEDICINE

## 2024-12-03 PROCEDURE — 90471 IMMUNIZATION ADMIN: CPT

## 2024-12-03 PROCEDURE — 90750 HZV VACC RECOMBINANT IM: CPT

## 2024-12-03 NOTE — ASSESSMENT & PLAN NOTE
See previous extended issues with shingles right upper extremity, subsequent ulnar drift with weakness of hand, had been out of work for quite some time.  She did return to work in January of this year, had worked part-time until about September, now has been working full-time and feels he is able to fulfill job duties despite some ongoing weakness with drift.  Continue with home exercises.     She did do second shingles shot here today.

## 2024-12-03 NOTE — ASSESSMENT & PLAN NOTE
She continues to see rheumatology, in August they decrease CellCept further to 500 mg daily.    They order routine blood work, November CBC, CMP looked okay.      _________________________________________________________      She has no increased depressive symptoms, remains off medication since 2023, TSH was okay in 2023.    Mammogram was okay in June 2024, she saw [facility] gynecology in June 2024.    Colonoscopy was done in August 2018 through [facility], redo 10 years was advised.

## 2024-12-03 NOTE — ASSESSMENT & PLAN NOTE
She is using Rosuvastatin 5 mg 3 times weekly.  Will plan to redo lipid panel again next year.  She will set up a routine physical for 6 months.    November CBC, CMP were okay

## 2024-12-03 NOTE — PROGRESS NOTES
Name: Trae Bush      : 1960      MRN: [id number]  Encounter Provider: Jeff Kevin DO  Encounter Date: 12/3/2024   Encounter department: [facility]ending sale to [facility]  :  Assessment & Plan  Hypercholesterolemia  She is using Rosuvastatin 5 mg 3 times weekly.  Will plan to redo lipid panel again next year.  She will set up a routine physical for 6 months.    November CBC, CMP were okay       Postherpetic neuralgia R Upper Extremity  See previous extended issues with shingles right upper extremity, subsequent ulnar drift with weakness of hand, had been out of work for quite some time.  She did return to work in January of this year, had worked part-time until about September, now has been working full-time and feels he is able to fulfill job duties despite some ongoing weakness with drift.  Continue with home exercises.     She did do second shingles shot here today.  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       Weakness of right hand assoc w shingles  See above           Blood pressure elevated without history of HTN  Blood pressure today after sitting was still running slightly high, 148/94, she relates at home she has been running around 130/80.  Reevaluate at follow-up, may need medication.  CBC, CMP were okay in November.       Encounter for immunization    Orders:    Zoster Vaccine Recombinant IM  She did a flu shot at [facility].  Cutaneous lupus erythematosus  She continues to see rheumatology, in August they decrease CellCept further to 500 mg daily.    They order routine blood work, November CBC, CMP looked okay.      _________________________________________________________      She has no increased depressive symptoms, remains off medication since , TSH was okay in .    Mammogram was okay in 2024, she saw [facility] gynecology in 2024.    Colonoscopy was done in 2018 through [facility], redo 10  "years was advised.       ______________________________________________  ______________________________________________           History of Present Illness     HPI  Review of Systems   Constitutional:  Negative for appetite change, fatigue, fever and unexpected weight change.   HENT:  Negative for sore throat and trouble swallowing.    Respiratory:  Negative for cough, chest tightness and shortness of breath.    Cardiovascular:  Negative for chest pain, palpitations and leg swelling.   Gastrointestinal:  Negative for abdominal pain, blood in stool, nausea and vomiting.        No acid reflux     No change in bowel   Genitourinary:  Negative for dysuria and hematuria.   Neurological:  Negative for dizziness, syncope, light-headedness and headaches.   Psychiatric/Behavioral:  Negative for behavioral problems and confusion.      Current Outpatient Medications on File Prior to Visit   Medication Sig Dispense Refill    Calcium Carb-Cholecalciferol (CALCIUM 600 + D PO) Take 1 tablet by mouth daily      Cholecalciferol (VITAMIN D3) 50 MCG (2000 UT) capsule Take 1 capsule (2,000 Units total) by mouth daily      diphenhydrAMINE (BENADRYL) 25 mg tablet Take 25 mg by mouth daily at bedtime as needed for sleep or allergies      Multiple Vitamins-Minerals (ONE-A-DAY 50 PLUS PO) Take 1 tablet by mouth daily      mycophenolate (CELLCEPT) 500 mg tablet Take 1 tablet (500 mg total) by mouth 2 (two) times a day (Patient taking differently: Take 500 mg by mouth every morning)      Omega-3 Fatty Acids (OMEGA 3 PO) Take 500 mg by mouth daily Once daily      rosuvastatin (CRESTOR) 5 mg tablet Take 1 tablet (5 mg total) by mouth 3 (three) times a week 45 tablet 5     No current facility-administered medications on file prior to visit.          Objective   /94 (BP Location: Left arm, Patient Position: Sitting, Cuff Size: Standard)   Pulse 68   Resp 12   Ht 5' 3\" (1.6 m)   Wt 68.5 kg (151 lb)   SpO2 99%   BMI 26.75 kg/m²    "   Physical Exam  Constitutional:       Appearance: Normal appearance. She is not ill-appearing.   Eyes:      General: No scleral icterus.  Cardiovascular:      Rate and Rhythm: Normal rate and regular rhythm.      Heart sounds: Normal heart sounds. No murmur heard.  Pulmonary:      Effort: Pulmonary effort is normal. No respiratory distress.      Breath sounds: Normal breath sounds. No wheezing, rhonchi or rales.   Abdominal:      Tenderness: There is no abdominal tenderness.   Musculoskeletal:      Right lower leg: No edema.      Left lower leg: No edema.      Comments: She has mild weakness right hand, much improved versus prior visits, some mild ongoing decreased fine motor but functional.  Good color and warmth.  Still has some ulnar drift.       Lymphadenopathy:      Cervical: No cervical adenopathy.   Skin:     Coloration: Skin is not jaundiced.   Neurological:      Mental Status: She is alert. Mental status is at baseline.   Psychiatric:         Behavior: Behavior normal.

## 2025-05-28 ENCOUNTER — RA CDI HCC (OUTPATIENT)
Dept: OTHER | Facility: HOSPITAL | Age: 65
End: 2025-05-28

## 2025-06-05 ENCOUNTER — OFFICE VISIT (OUTPATIENT)
Dept: FAMILY MEDICINE CLINIC | Facility: CLINIC | Age: 65
End: 2025-06-05
Payer: COMMERCIAL

## 2025-06-05 VITALS
WEIGHT: 150 LBS | OXYGEN SATURATION: 96 % | BODY MASS INDEX: 26.58 KG/M2 | DIASTOLIC BLOOD PRESSURE: 70 MMHG | HEART RATE: 80 BPM | SYSTOLIC BLOOD PRESSURE: 134 MMHG | HEIGHT: 63 IN

## 2025-06-05 DIAGNOSIS — Z12.31 ENCOUNTER FOR SCREENING MAMMOGRAM FOR BREAST CANCER: ICD-10-CM

## 2025-06-05 DIAGNOSIS — R31.29 MICROSCOPIC HEMATURIA: ICD-10-CM

## 2025-06-05 DIAGNOSIS — Z00.00 PHYSICAL EXAM: Primary | ICD-10-CM

## 2025-06-05 DIAGNOSIS — E78.00 HYPERCHOLESTEROLEMIA: ICD-10-CM

## 2025-06-05 PROCEDURE — 99397 PER PM REEVAL EST PAT 65+ YR: CPT | Performed by: FAMILY MEDICINE

## 2025-06-05 NOTE — ASSESSMENT & PLAN NOTE
No h/o renal calculi  Pt thinks that she had workup with uro in past  Will check urine  No h/o tob ue    Orders:    Urinalysis with microscopic; Future

## 2025-06-05 NOTE — PROGRESS NOTES
Name: Trae Bush      : 1960      MRN: [id number]  Encounter Provider: Ciera De Leon DO  Encounter Date: 2025   Encounter department: [facility]  :  Assessment & Plan  Encounter for screening mammogram for breast cancer    Orders:    Mammo screening bilateral w 3d and cad; Future    Physical exam  Anticipatory guidance and preventative medicine discussed  Fasting labs and urine -   Pt with h/o microscopic hematuria  Vaccines up to date  H/o post-herpetic neuralgia  Affected hand  Rx given for mammo today  Colonoscopy up to date  And will be seeing gyn In July  Continue with walking  Goes to eye dr and dentist    Patient Instructions   Get fasting labs and urine  And if you dont hear from dr velez re: results - please contact me    Keep up the good work.                 Microscopic hematuria  No h/o renal calculi  Pt thinks that she had workup with uro in past  Will check urine  No h/o tob ue    Orders:    Urinalysis with microscopic; Future    Hypercholesterolemia    Orders:    Lipid panel; Future    TSH, 3rd generation; Future          Depression Screening and Follow-up Plan: Patient was screened for depression during today's encounter. They screened negative with a PHQ-2 score of 0.        History of Present Illness   HPI  Review of Systems   Constitutional:  Positive for diaphoresis. Negative for chills and fever.        May get some night sweats  Walks     Respiratory:  Negative for cough, shortness of breath and wheezing.    Cardiovascular:  Negative for chest pain and palpitations.   Gastrointestinal:  Negative for abdominal pain, blood in stool, constipation, diarrhea, nausea and vomiting.   Genitourinary:  Negative for dysuria, hematuria and vaginal bleeding.   Allergic/Immunologic: Positive for environmental allergies.   Neurological:  Negative for dizziness, syncope and headaches.   Psychiatric/Behavioral:  Negative for self-injury and suicidal ideas.         No  "regular anxiety/depression  Lives with     Pt is working parttime         Objective   /70   Pulse 80   Ht 5' 3.25\" (1.607 m)   Wt 68 kg (150 lb)   SpO2 96%   BMI 26.36 kg/m²      Physical Exam  Vitals and nursing note reviewed.   Constitutional:       General: She is not in acute distress.     Appearance: Normal appearance. She is not ill-appearing or toxic-appearing.      Comments: Appears younger than stated age.     HENT:      Right Ear: Tympanic membrane normal.      Left Ear: Tympanic membrane normal.      Nose: Nose normal.      Mouth/Throat:      Mouth: Mucous membranes are moist.      Pharynx: No oropharyngeal exudate.     Eyes:      General: No scleral icterus.     Extraocular Movements: Extraocular movements intact.      Pupils: Pupils are equal, round, and reactive to light.     Neck:      Vascular: No carotid bruit.     Cardiovascular:      Rate and Rhythm: Normal rate and regular rhythm.      Pulses: Normal pulses.      Heart sounds: Normal heart sounds. No murmur heard.  Pulmonary:      Effort: Pulmonary effort is normal. No respiratory distress.      Breath sounds: Normal breath sounds. No wheezing, rhonchi or rales.   Abdominal:      General: There is no distension.      Palpations: Abdomen is soft. There is no mass.      Tenderness: There is no abdominal tenderness. There is no guarding or rebound.     Musculoskeletal:      Cervical back: Neck supple. No tenderness.      Right lower leg: No edema.      Left lower leg: No edema.   Lymphadenopathy:      Cervical: No cervical adenopathy.     Skin:     General: Skin is warm.      Coloration: Skin is not jaundiced.     Neurological:      General: No focal deficit present.      Mental Status: She is alert and oriented to person, place, and time.     Psychiatric:         Mood and Affect: Mood normal.         Behavior: Behavior normal.         Thought Content: Thought content normal.         Judgment: Judgment normal.         "

## 2025-06-20 LAB
BACTERIA URNS QL MICRO: ABNORMAL
CHOLEST SERPL-MCNC: 220 MG/DL
CHOLEST/HDLC SERPL: 4 {RATIO}
GLUCOSE UR QL STRIP: NEGATIVE MG/DL
HDLC SERPL-MCNC: 55 MG/DL (ref 23–92)
HGB UR QL STRIP: NEGATIVE MG/DL
KETONES UR QL STRIP: NEGATIVE MG/DL
LDLC SERPL CALC-MCNC: 148 MG/DL
LEUKOCYTE ESTERASE UR QL STRIP: NEGATIVE /UL
MUCOUS THREADS URNS QL MICRO: ABNORMAL
NITRITE UR QL STRIP: NEGATIVE
NONHDLC SERPL-MCNC: 165 MG/DL
PH UR: 6 [PH] (ref 4.5–8)
PROT 24H UR-MRATE: NEGATIVE MG/DL
RBC #/AREA URNS HPF: 0 /HPF (ref 0–2)
SL AMB POCT URINE COMMENT: ABNORMAL
SP GR UR: 1.02 (ref 1–1.03)
SQUAMOUS #/AREA URNS HPF: >10 /LPF (ref 0–5)
TRIGL SERPL-MCNC: 83 MG/DL
TSH SERPL-ACNC: 1.64 UIU/ML (ref 0.45–5.33)
WBC #/AREA URNS HPF: 0 /HPF (ref 0–5)

## 2025-06-22 ENCOUNTER — RESULTS FOLLOW-UP (OUTPATIENT)
Dept: FAMILY MEDICINE CLINIC | Facility: CLINIC | Age: 65
End: 2025-06-22

## 2025-06-22 DIAGNOSIS — E78.00 HYPERCHOLESTEROLEMIA: Primary | ICD-10-CM

## 2025-06-22 RX ORDER — ROSUVASTATIN CALCIUM 10 MG/1
10 TABLET, COATED ORAL DAILY
Qty: 100 TABLET | Refills: 1 | Status: SHIPPED | OUTPATIENT
Start: 2025-06-22

## 2025-06-22 NOTE — RESULT ENCOUNTER NOTE
Please call pt :  Good morning  Your lipids are improving, but still above normal range  I would like you to increase your crestor from 5 mg daily to 10 mg daily  And then get a followup blood test - nonfasting - in 6 weeks  Labslip is in chart, and I have sent a new rx to the pharmacy for crestor 10 mg tabs;  Also - continue with heart healthy diet and exercise  Enjoy your day!  Dr velez

## 2025-06-26 ENCOUNTER — TELEPHONE (OUTPATIENT)
Dept: FAMILY MEDICINE CLINIC | Facility: CLINIC | Age: 65
End: 2025-06-26

## 2025-06-26 NOTE — RESULT ENCOUNTER NOTE
Please call pt -   Good morning  Just confirming that you received my message re: needing to increase crestor dosing based on recent labs - and repeat labs in 4-6 weeks.  Thanks  Dr velez

## 2025-06-26 NOTE — TELEPHONE ENCOUNTER
Relayed results to patient as per provider message. Patient expressed understanding and did not have any further questions.

## 2025-06-26 NOTE — TELEPHONE ENCOUNTER
Please call pt -   Good morning  Just confirming that you received my message re: needing to increase crestor dosing based on recent labs - and repeat labs in 4-6 weeks.  Thanks  Dr velez      Also - pt is overdue for mammogram  Rx is in chart

## 2025-07-08 ENCOUNTER — VBI (OUTPATIENT)
Dept: ADMINISTRATIVE | Facility: OTHER | Age: 65
End: 2025-07-08

## 2025-07-08 NOTE — TELEPHONE ENCOUNTER
07/08/25 11:36 AM     Chart reviewed for CRC: Colonoscopy ; nothing is submitted to the patient's insurance at this time.     Mansi Gibbs MA   [facility]